# Patient Record
Sex: FEMALE | Race: WHITE | NOT HISPANIC OR LATINO | Employment: OTHER | URBAN - METROPOLITAN AREA
[De-identification: names, ages, dates, MRNs, and addresses within clinical notes are randomized per-mention and may not be internally consistent; named-entity substitution may affect disease eponyms.]

---

## 2017-09-18 ENCOUNTER — TRANSCRIBE ORDERS (OUTPATIENT)
Dept: ADMINISTRATIVE | Facility: HOSPITAL | Age: 67
End: 2017-09-18

## 2017-09-18 ENCOUNTER — ALLSCRIPTS OFFICE VISIT (OUTPATIENT)
Dept: OTHER | Facility: OTHER | Age: 67
End: 2017-09-18

## 2017-09-18 DIAGNOSIS — M21.70 UNEQUAL LEG LENGTH (ACQUIRED): Primary | ICD-10-CM

## 2017-09-19 ENCOUNTER — HOSPITAL ENCOUNTER (OUTPATIENT)
Dept: RADIOLOGY | Facility: HOSPITAL | Age: 67
Discharge: HOME | End: 2017-09-19
Payer: MEDICARE

## 2017-09-19 DIAGNOSIS — M21.70 UNEQUAL LEG LENGTH (ACQUIRED): ICD-10-CM

## 2017-09-19 PROCEDURE — 77073 BONE LENGTH STUDIES: CPT

## 2017-09-22 ENCOUNTER — GENERIC CONVERSION - ENCOUNTER (OUTPATIENT)
Dept: OTHER | Facility: OTHER | Age: 67
End: 2017-09-22

## 2017-10-03 ENCOUNTER — GENERIC CONVERSION - ENCOUNTER (OUTPATIENT)
Dept: OTHER | Facility: OTHER | Age: 67
End: 2017-10-03

## 2017-10-13 ENCOUNTER — GENERIC CONVERSION - ENCOUNTER (OUTPATIENT)
Dept: OTHER | Facility: OTHER | Age: 67
End: 2017-10-13

## 2017-10-17 ENCOUNTER — GENERIC CONVERSION - ENCOUNTER (OUTPATIENT)
Dept: OTHER | Facility: OTHER | Age: 67
End: 2017-10-17

## 2017-10-23 ENCOUNTER — ALLSCRIPTS OFFICE VISIT (OUTPATIENT)
Dept: OTHER | Facility: OTHER | Age: 67
End: 2017-10-23

## 2017-10-26 NOTE — PROGRESS NOTES
Assessment  1  Chronic right-sided low back pain with right-sided sciatica (724 2,724 3,338 29)   (M54 41,G89 29)   2  Joint disorder of pelvis or thigh (719 95) (M25 9)   3  Segmental and somatic dysfunction of sacral region (739 4) (M99 04)   4  Pubic bone pain (733 90) (M89 9)   5  Unequal leg length (736 81) (M21 70)   6  Muscle tightness (728 9) (M62 89)    Plan  Chronic right-sided low back pain with right-sided sciatica    · MethylPREDNISolone 4 MG Oral Tablet; USE AS DIRECTED  Chronic right-sided low back pain with right-sided sciatica, Joint disorder of pelvis or  thigh, Pubic bone pain, Segmental and somatic dysfunction of sacral region    · OMT 3-4 body regions - POC; Status:Complete;   Done: [id number] 01:20PM  Unequal leg length    · * CT LEG LENGTH EVALUATION (SCANOGRAM); Status:Need Information - Financial  Authorization; Requested for:[id number];    · Follow-up visit in 1 week Evaluation and Treatment  Follow-up  Status: Hold For -  Scheduling  Requested for: [id number]    Discussion/Summary  Discussion Summary:   No heat  Call 48 hours after leg length study and see if need foot lifts  energy of Pube, manipulate sacrum and pelvis  Stretched tight soft tissue  minutes spent with patient  Counseling Documentation With Imm: The patient was counseled regarding instructions for management,-- risk factor reductions  Patient Education: Educational resources provided: None  Medication SE Review and Pt Understands Tx: Possible side effects of new medications were reviewed with the patient/guardian today  The treatment plan was reviewed with the patient/guardian  The patient/guardian understands and agrees with the treatment plan   Self Referrals:   Self Referrals: No      Chief Complaint  Chief Complaint Free Text Note Form: pt here today for lower back pain stated that she had an Xray      History of Present Illness  HPI: First Visit  Back pain on and off for years, 1980 's     7 months ago, was lifting  a lot  Andrew Diop) referred  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  low back, sacral area, and goes into right buttocks  Chronic positional pain past 6 months  was exercising, this helped  at waist makes pain feel better  Goes into a hot tub a lot, heat feels good  Going to PT at [facility]  Has a Dr (Internist in [place])  noted use to crack SI joint when 15years old  Review of Systems  Complete-Female:   Constitutional: No fever, no chills, feels well, no tiredness, no recent weight gain or weight loss  Musculoskeletal: as noted in HPI  Integumentary: No complaints of skin rash or lesions, no itching, no skin wounds, no breast pain or lump  Active Problems  1  Back pain (724 5) (M54 9)   2  Depression (311) (F32 9)   3  GERD (gastroesophageal reflux disease) (530 81) (K21 9)   4  Heart disease (429 9) (I51 9)   5  Hypercholesterolemia (272 0) (E78 00)   6  Hypertension (401 9) (I10)    Past Medical History  1  History of cataract (V12 49) (Z86 69)    Surgical History  1  History of Cataract Surgery    Family History  Mother    1  Denied: Family history of Alcohol abuse   2  Family history of    3  Family history of cardiac disorder (V17 49) (Z82 49)   4  Denied: Family history of substance abuse  Father    5  Denied: Family history of Alcohol abuse   6  Family history of    7  Family history of renal failure (V18 69) (Z84 1)   8  Denied: Family history of substance abuse  Brother    5  Family history of    8  Family history of cardiac disorder (V17 49) (Z82 49)  Maternal Grandmother    11  Family history of    15  Family history of colon cancer (V16 0) (Z80 0)  Maternal Grandfather    15  Family history of     Social History   · Always uses seat belt   · Drinks coffee   · Denied: History of Drug use   · Never a smoker   · Rarely consumes alcohol (V49 89) (Z78 9)    Current Meds   1  Cozaar 50 MG Oral Tablet;  Take 1 tablet daily; Therapy: 18Sep2017 to Recorded   2  Crestor 40 MG Oral Tablet; TAKE 1 TABLET DAILY; Therapy: 18Sep2017 to Recorded   3  PriLOSEC OTC 20 MG Oral Tablet Delayed Release; take 2 tablet daily; Therapy: 18Sep2017 to Recorded   4  PROzac 20 MG Oral Capsule; TAKE 1 CAPSULE Daily; Therapy: 18Sep2017 to Recorded   5  Synthroid 150 MCG Oral Tablet; TAKE 1 TABLET DAILY; Therapy: 18Sep2017 to Recorded    Allergies  1  Trimethoprim  2  Latex    Vitals  Vital Signs    Recorded: 18Sep2017 12:17PM   Temperature 97 5 F, Oral   Heart Rate 75   Systolic 818, LUE   Diastolic 88, LUE   Height 5 ft 5 in   Weight 159 lb    BMI Calculated 26 46   BSA Calculated 1 79   O2 Saturation 98     Physical Exam    Constitutional   General appearance: No acute distress, well appearing and well nourished  Eyes   Conjunctiva and lids: No swelling, erythema or discharge  Ears, Nose, Mouth, and Throat   External inspection of ears and nose: Normal     Pulmonary   Respiratory effort: No increased work of breathing or signs of respiratory distress  Musculoskeletal   Gait and station: Abnormal  -- (Stand : Inferior right innominate)   Digits and nails: Normal without clubbing or cyanosis  Inspection/palpation of joints, bones, and muscles: Abnormal  -- (Sit: Rib #1 right posterior, inferior  Supine: Inferior right tender pube  Prone: Inferior right PSIS, Sacrum deep on right  Increased soft tissue tension and tenderness right LSI area)   Skin   Skin and subcutaneous tissue: Normal without rashes or lesions  Neurologic   Sensation: No sensory loss  Psychiatric   Orientation to person, place, and time: Normal     Mood and affect: Normal          Results/Data  PHQ-2 Adult Depression Screening 18Sep2017 12:25PM UserTrino     Test Name Result Flag Reference   PHQ-2 Adult Depression Score 6     Over the last two weeks, how often have you been bothered by any of the following problems?   Little interest or pleasure in doing things: Nearly every day - 3  Feeling down, depressed, or hopeless: Nearly every day - 3   PHQ-2 Adult Depression Screening Positive         Signatures   Electronically signed by : Merly Courtney DO; Sep 18 2017  1:25PM EST                       (Author)

## 2017-10-27 ENCOUNTER — ALLSCRIPTS OFFICE VISIT (OUTPATIENT)
Dept: OTHER | Facility: OTHER | Age: 67
End: 2017-10-27

## 2017-10-31 ENCOUNTER — GENERIC CONVERSION - ENCOUNTER (OUTPATIENT)
Dept: OTHER | Facility: OTHER | Age: 67
End: 2017-10-31

## 2017-11-03 ENCOUNTER — ALLSCRIPTS OFFICE VISIT (OUTPATIENT)
Dept: OTHER | Facility: OTHER | Age: 67
End: 2017-11-03

## 2017-11-13 ENCOUNTER — GENERIC CONVERSION - ENCOUNTER (OUTPATIENT)
Dept: OTHER | Facility: OTHER | Age: 67
End: 2017-11-13

## 2017-11-17 ENCOUNTER — GENERIC CONVERSION - ENCOUNTER (OUTPATIENT)
Dept: OTHER | Facility: OTHER | Age: 67
End: 2017-11-17

## 2017-11-24 ENCOUNTER — GENERIC CONVERSION - ENCOUNTER (OUTPATIENT)
Dept: OTHER | Facility: OTHER | Age: 67
End: 2017-11-24

## 2017-11-28 ENCOUNTER — GENERIC CONVERSION - ENCOUNTER (OUTPATIENT)
Dept: OTHER | Facility: OTHER | Age: 67
End: 2017-11-28

## 2017-12-08 ENCOUNTER — GENERIC CONVERSION - ENCOUNTER (OUTPATIENT)
Dept: OTHER | Facility: OTHER | Age: 67
End: 2017-12-08

## 2017-12-12 ENCOUNTER — GENERIC CONVERSION - ENCOUNTER (OUTPATIENT)
Dept: OTHER | Facility: OTHER | Age: 67
End: 2017-12-12

## 2017-12-15 ENCOUNTER — GENERIC CONVERSION - ENCOUNTER (OUTPATIENT)
Dept: OTHER | Facility: OTHER | Age: 67
End: 2017-12-15

## 2017-12-20 ENCOUNTER — GENERIC CONVERSION - ENCOUNTER (OUTPATIENT)
Dept: OTHER | Facility: OTHER | Age: 67
End: 2017-12-20

## 2017-12-22 ENCOUNTER — GENERIC CONVERSION - ENCOUNTER (OUTPATIENT)
Dept: OTHER | Facility: OTHER | Age: 67
End: 2017-12-22

## 2017-12-26 ENCOUNTER — GENERIC CONVERSION - ENCOUNTER (OUTPATIENT)
Dept: OTHER | Facility: OTHER | Age: 67
End: 2017-12-26

## 2018-01-02 ENCOUNTER — GENERIC CONVERSION - ENCOUNTER (OUTPATIENT)
Dept: OTHER | Facility: OTHER | Age: 68
End: 2018-01-02

## 2018-01-05 ENCOUNTER — GENERIC CONVERSION - ENCOUNTER (OUTPATIENT)
Dept: OTHER | Facility: OTHER | Age: 68
End: 2018-01-05

## 2018-01-09 ENCOUNTER — GENERIC CONVERSION - ENCOUNTER (OUTPATIENT)
Dept: OTHER | Facility: OTHER | Age: 68
End: 2018-01-09

## 2018-01-10 ENCOUNTER — GENERIC CONVERSION - ENCOUNTER (OUTPATIENT)
Dept: OTHER | Facility: OTHER | Age: 68
End: 2018-01-10

## 2018-01-12 NOTE — MISCELLANEOUS
Message    30 Oct 2017 2:20 PM   Pt c/o back pain again  Pain didn't let her sleep last night, tried to go out today but can't handle it  Needs to drive to [facility] tomorrow  Adilson Miranda - 30 Oct 2017 4:18 PM   Medrol dose pack  Pt called back before we could let her know about medrol dose rc  She stated she will just keep her appt for Wednesday and rest to see if she gets relief  Pt left a msg on 10/31/2017 and said she was unable to keep her appt for 11/1/17 because she has to be back at [place] for an appt with her skin cancer doctor  She asked if she could be seen today but schedule was really full, pt refused to have medrol dose rc rx so her appt was rescheduled to 11/3/17  BF/VFP                Active Problems   1  Acute right-sided low back pain with right-sided sciatica (724 2,724 3) (M54 41)  2  Back pain (724 5) (M54 9)  3  Chronic right-sided low back pain with right-sided sciatica (724 2,724 3,338 29)   (M54 41,G89 29)  4  Depression (311) (F32 9)  5  Encounter for special screening examination for genitourinary disorder (V81 6) (Z13 89)  6  GERD (gastroesophageal reflux disease) (530 81) (K21 9)  7  Heart disease (429 9) (I51 9)  8  Hypercholesterolemia (272 0) (E78 00)  9  Hypertension (401 9) (I10)  10  Joint disorder of pelvis or thigh (719 95) (M25 9)  11  Lumbar radiculopathy (724 4) (M54 16)  12  Muscle strain (848 9) (T14 8XXA)  13  Muscle tightness (728 9) (M62 89)  14  Pubic bone pain (733 90) (M89 9)  15  Rib pain on left side (786 50) (R07 81)  16  Sacrum sprain, subsequent encounter (V58 89,847 3) (S33 8XXD)  17  Segmental and somatic dysfunction of lumbar region (739 3) (M99 03)  18  Segmental and somatic dysfunction of sacral region (739 4) (M99 04)  19  Segmental dysfunction of pelvic region (739 5) (M99 05)  20  Somatic dysfunction of lumbar region (739 3) (M99 03)  21  Strain of back, subsequent encounter (V58 89,847 9) (S39 012D)  22   Strain of lumbar region, initial encounter (847 2) (S39 012A)  23  Strain of pelvis, initial encounter (848 5) (S39 013A)  24  Strain of pelvis, subsequent encounter (V58 89,848 5) (S39 013D)  25  Thoracic back pain (724 1) (M54 6)  26  Unequal leg length (736 81) (M21 70)    Current Meds  1  Cozaar 50 MG Oral Tablet; Take 1 tablet daily; Therapy: 43Caj7064 to Recorded  2  Crestor 40 MG Oral Tablet; TAKE 1 TABLET DAILY; Therapy: 35Sho0017 to Recorded  3  PriLOSEC OTC 20 MG Oral Tablet Delayed Release; take 2 tablet daily; Therapy: 02Eih9570 to Recorded  4  PROzac 20 MG Oral Capsule; TAKE 1 CAPSULE Daily; Therapy: 68Bif2319 to Recorded  5  Synthroid 150 MCG Oral Tablet; TAKE 1 TABLET DAILY; Therapy: 09Oea5770 to Recorded    Allergies   1  Trimethoprim   2   Latex    Signatures   Electronically signed by : Neeru Quintanilla DO; Nov 1 2017  9:27AM EST                       (Author)

## 2018-01-13 VITALS
TEMPERATURE: 97.9 F | SYSTOLIC BLOOD PRESSURE: 132 MMHG | HEIGHT: 65 IN | DIASTOLIC BLOOD PRESSURE: 70 MMHG | BODY MASS INDEX: 32.49 KG/M2 | WEIGHT: 195 LBS | HEART RATE: 79 BPM | RESPIRATION RATE: 16 BRPM | OXYGEN SATURATION: 98 %

## 2018-01-14 VITALS
DIASTOLIC BLOOD PRESSURE: 88 MMHG | OXYGEN SATURATION: 98 % | SYSTOLIC BLOOD PRESSURE: 146 MMHG | TEMPERATURE: 97.5 F | BODY MASS INDEX: 26.49 KG/M2 | HEIGHT: 65 IN | HEART RATE: 75 BPM | WEIGHT: 159 LBS

## 2018-01-14 VITALS
OXYGEN SATURATION: 98 % | DIASTOLIC BLOOD PRESSURE: 76 MMHG | SYSTOLIC BLOOD PRESSURE: 142 MMHG | HEIGHT: 65 IN | TEMPERATURE: 98.4 F | HEART RATE: 83 BPM

## 2018-01-14 NOTE — MISCELLANEOUS
Message  Return to work or school:   Ceci Mccarty is under my professional care  She was seen in my office on 10/23/2017  Patient is unable to do physical therapy until cleared by       She is not able to participate in sports or gym class           Signatures   Electronically signed by : Lisa Cherry DO; Oct 23 2017  2:12PM EST                       (Author)

## 2018-01-19 ENCOUNTER — GENERIC CONVERSION - ENCOUNTER (OUTPATIENT)
Dept: OTHER | Facility: OTHER | Age: 68
End: 2018-01-19

## 2018-01-19 DIAGNOSIS — M99.05 SEGMENTAL AND SOMATIC DYSFUNCTION OF PELVIC REGION: ICD-10-CM

## 2018-01-19 DIAGNOSIS — M54.2 CERVICALGIA: ICD-10-CM

## 2018-01-19 DIAGNOSIS — M99.04 SEGMENTAL AND SOMATIC DYSFUNCTION OF SACRAL REGION: ICD-10-CM

## 2018-01-22 ENCOUNTER — APPOINTMENT (OUTPATIENT)
Dept: PHYSICAL THERAPY | Facility: REHABILITATION | Age: 68
End: 2018-01-22
Payer: MEDICARE

## 2018-01-22 VITALS
TEMPERATURE: 97.8 F | DIASTOLIC BLOOD PRESSURE: 88 MMHG | SYSTOLIC BLOOD PRESSURE: 148 MMHG | OXYGEN SATURATION: 98 % | HEIGHT: 65 IN | WEIGHT: 186 LBS | HEART RATE: 80 BPM | HEART RATE: 85 BPM | SYSTOLIC BLOOD PRESSURE: 144 MMHG | BODY MASS INDEX: 30.99 KG/M2 | HEIGHT: 65 IN | OXYGEN SATURATION: 97 % | DIASTOLIC BLOOD PRESSURE: 86 MMHG | TEMPERATURE: 97.7 F

## 2018-01-22 VITALS
HEIGHT: 65 IN | HEART RATE: 83 BPM | TEMPERATURE: 98.2 F | OXYGEN SATURATION: 98 % | DIASTOLIC BLOOD PRESSURE: 84 MMHG | SYSTOLIC BLOOD PRESSURE: 142 MMHG

## 2018-01-22 VITALS
SYSTOLIC BLOOD PRESSURE: 146 MMHG | HEART RATE: 80 BPM | DIASTOLIC BLOOD PRESSURE: 88 MMHG | BODY MASS INDEX: 30.82 KG/M2 | HEIGHT: 65 IN | TEMPERATURE: 97.6 F | WEIGHT: 185 LBS | OXYGEN SATURATION: 98 %

## 2018-01-22 VITALS
HEIGHT: 65 IN | SYSTOLIC BLOOD PRESSURE: 142 MMHG | OXYGEN SATURATION: 98 % | DIASTOLIC BLOOD PRESSURE: 84 MMHG | TEMPERATURE: 97.3 F | HEART RATE: 84 BPM

## 2018-01-22 VITALS
HEART RATE: 78 BPM | HEIGHT: 65 IN | SYSTOLIC BLOOD PRESSURE: 118 MMHG | WEIGHT: 192 LBS | DIASTOLIC BLOOD PRESSURE: 62 MMHG | OXYGEN SATURATION: 98 % | BODY MASS INDEX: 31.99 KG/M2 | TEMPERATURE: 97.9 F | RESPIRATION RATE: 16 BRPM

## 2018-01-22 VITALS
DIASTOLIC BLOOD PRESSURE: 76 MMHG | OXYGEN SATURATION: 97 % | TEMPERATURE: 98.1 F | SYSTOLIC BLOOD PRESSURE: 142 MMHG | HEART RATE: 85 BPM | HEIGHT: 65 IN

## 2018-01-22 VITALS
TEMPERATURE: 97.7 F | HEART RATE: 78 BPM | DIASTOLIC BLOOD PRESSURE: 88 MMHG | SYSTOLIC BLOOD PRESSURE: 146 MMHG | BODY MASS INDEX: 31.49 KG/M2 | WEIGHT: 189 LBS | OXYGEN SATURATION: 98 % | HEIGHT: 65 IN

## 2018-01-22 VITALS
SYSTOLIC BLOOD PRESSURE: 144 MMHG | OXYGEN SATURATION: 97 % | WEIGHT: 188 LBS | DIASTOLIC BLOOD PRESSURE: 86 MMHG | HEIGHT: 65 IN | TEMPERATURE: 97.6 F | BODY MASS INDEX: 31.32 KG/M2 | HEART RATE: 90 BPM

## 2018-01-22 DIAGNOSIS — M54.2 CERVICALGIA: ICD-10-CM

## 2018-01-22 DIAGNOSIS — M99.04 SEGMENTAL AND SOMATIC DYSFUNCTION OF SACRAL REGION: ICD-10-CM

## 2018-01-22 DIAGNOSIS — M99.05 SEGMENTAL AND SOMATIC DYSFUNCTION OF PELVIC REGION: ICD-10-CM

## 2018-01-22 PROCEDURE — 97110 THERAPEUTIC EXERCISES: CPT

## 2018-01-22 PROCEDURE — 97140 MANUAL THERAPY 1/> REGIONS: CPT

## 2018-01-22 PROCEDURE — G8990 OTHER PT/OT CURRENT STATUS: HCPCS

## 2018-01-22 PROCEDURE — 97161 PT EVAL LOW COMPLEX 20 MIN: CPT

## 2018-01-22 PROCEDURE — G8991 OTHER PT/OT GOAL STATUS: HCPCS

## 2018-01-24 VITALS
HEIGHT: 65 IN | TEMPERATURE: 98.1 F | OXYGEN SATURATION: 98 % | HEART RATE: 77 BPM | DIASTOLIC BLOOD PRESSURE: 72 MMHG | BODY MASS INDEX: 30.32 KG/M2 | SYSTOLIC BLOOD PRESSURE: 134 MMHG | WEIGHT: 182 LBS

## 2018-01-24 VITALS
BODY MASS INDEX: 29.95 KG/M2 | SYSTOLIC BLOOD PRESSURE: 140 MMHG | DIASTOLIC BLOOD PRESSURE: 80 MMHG | WEIGHT: 180 LBS | HEART RATE: 74 BPM | RESPIRATION RATE: 14 BRPM | OXYGEN SATURATION: 98 %

## 2018-01-24 VITALS
DIASTOLIC BLOOD PRESSURE: 84 MMHG | BODY MASS INDEX: 30.32 KG/M2 | SYSTOLIC BLOOD PRESSURE: 144 MMHG | WEIGHT: 182 LBS | HEIGHT: 65 IN | TEMPERATURE: 97.9 F | OXYGEN SATURATION: 98 % | HEART RATE: 72 BPM

## 2018-01-24 VITALS
HEART RATE: 83 BPM | OXYGEN SATURATION: 97 % | BODY MASS INDEX: 30.49 KG/M2 | SYSTOLIC BLOOD PRESSURE: 146 MMHG | HEIGHT: 65 IN | TEMPERATURE: 97.9 F | DIASTOLIC BLOOD PRESSURE: 84 MMHG | WEIGHT: 183 LBS

## 2018-01-24 VITALS
OXYGEN SATURATION: 98 % | HEART RATE: 82 BPM | TEMPERATURE: 98.1 F | SYSTOLIC BLOOD PRESSURE: 152 MMHG | HEIGHT: 65 IN | DIASTOLIC BLOOD PRESSURE: 88 MMHG

## 2018-01-24 VITALS
BODY MASS INDEX: 29.45 KG/M2 | HEIGHT: 65 IN | TEMPERATURE: 97.3 F | SYSTOLIC BLOOD PRESSURE: 146 MMHG | DIASTOLIC BLOOD PRESSURE: 88 MMHG | HEART RATE: 89 BPM | OXYGEN SATURATION: 98 %

## 2018-01-24 VITALS
HEIGHT: 65 IN | SYSTOLIC BLOOD PRESSURE: 148 MMHG | TEMPERATURE: 97.6 F | HEART RATE: 66 BPM | DIASTOLIC BLOOD PRESSURE: 72 MMHG | OXYGEN SATURATION: 98 % | BODY MASS INDEX: 30.32 KG/M2 | WEIGHT: 182 LBS

## 2018-01-24 VITALS
DIASTOLIC BLOOD PRESSURE: 72 MMHG | WEIGHT: 181 LBS | OXYGEN SATURATION: 98 % | SYSTOLIC BLOOD PRESSURE: 144 MMHG | HEART RATE: 78 BPM | HEIGHT: 65 IN | TEMPERATURE: 97.6 F | BODY MASS INDEX: 30.16 KG/M2

## 2018-01-24 VITALS
OXYGEN SATURATION: 96 % | HEIGHT: 65 IN | TEMPERATURE: 98.5 F | SYSTOLIC BLOOD PRESSURE: 142 MMHG | DIASTOLIC BLOOD PRESSURE: 88 MMHG | HEART RATE: 76 BPM

## 2018-01-24 VITALS
OXYGEN SATURATION: 98 % | SYSTOLIC BLOOD PRESSURE: 126 MMHG | HEIGHT: 65 IN | TEMPERATURE: 97.8 F | HEART RATE: 77 BPM | DIASTOLIC BLOOD PRESSURE: 72 MMHG | BODY MASS INDEX: 29.99 KG/M2 | WEIGHT: 180 LBS

## 2018-01-24 VITALS
DIASTOLIC BLOOD PRESSURE: 98 MMHG | OXYGEN SATURATION: 98 % | BODY MASS INDEX: 29.49 KG/M2 | RESPIRATION RATE: 15 BRPM | SYSTOLIC BLOOD PRESSURE: 148 MMHG | HEIGHT: 65 IN | HEART RATE: 107 BPM | WEIGHT: 177 LBS

## 2018-01-26 ENCOUNTER — OFFICE VISIT (OUTPATIENT)
Dept: PHYSICAL THERAPY | Facility: REHABILITATION | Age: 68
End: 2018-01-26
Payer: MEDICARE

## 2018-01-26 DIAGNOSIS — M54.2 CERVICALGIA: ICD-10-CM

## 2018-01-26 DIAGNOSIS — M99.04 SOMATIC DYSFUNCTION OF SACRAL REGION: Primary | ICD-10-CM

## 2018-01-26 DIAGNOSIS — M99.05 SOMATIC DYSFUNCTION OF PELVIS REGION: ICD-10-CM

## 2018-01-26 PROCEDURE — 97110 THERAPEUTIC EXERCISES: CPT | Performed by: PHYSICAL THERAPIST

## 2018-01-26 PROCEDURE — 97112 NEUROMUSCULAR REEDUCATION: CPT | Performed by: PHYSICAL THERAPIST

## 2018-01-26 PROCEDURE — 97140 MANUAL THERAPY 1/> REGIONS: CPT | Performed by: PHYSICAL THERAPIST

## 2018-01-26 PROCEDURE — G8990 OTHER PT/OT CURRENT STATUS: HCPCS | Performed by: PHYSICAL THERAPIST

## 2018-01-26 PROCEDURE — G8991 OTHER PT/OT GOAL STATUS: HCPCS | Performed by: PHYSICAL THERAPIST

## 2018-01-26 NOTE — PROGRESS NOTES
Daily Note     Today's date: 2018  Patient name: Marivel Smith  : 1950  MRN: [id number]  Referring provider: Edwardo Avalos DO  Dx:   Encounter Diagnoses   Name Primary?  Somatic dysfunction of sacral region Yes    Somatic dysfunction of pelvis region     Cervicalgia                   Subjective: Patient reports that her neck has been doing a little better and that her back was doing better but she twisted a certain way moving in bed this morning and really irritated it along right PSIS region  Objective: See treatment diary below  Precautions: HTN  Daily Treatment Diary     Manual  18        L3 gr 5  4 min  Sacral pressure 3 min  Exercise Diary  18        Seated c-spine rotation right arm elevated 4 min  Supine chin nod 2 x 10, 5sec        Seated chin nod  2x10, 5sec        TA BP cuff iso 1 x 10, 10 sec  TA BKFO cuff 2 x 10        Patient education SI belt        Supine hip add  2x10, 5 sec  Modalities                                                           Assessment: Tolerated treatment fair  Patient demonstrated fair core activation with exercises today  Improved symptom reporting with manual compression to bilateral ASIS  Educate patient on use of SI belt  Also cervical symptoms improve with support of right shoulder girdle  Plan: Continue per plan of care

## 2018-01-26 NOTE — PROGRESS NOTES
{SL AMB PT/OT NOTE [id number]}    Today's date: 2018  Patient name: Radha Kimball  : 1950  MRN: [id number]  Referring provider: Alyssa Farooq DO  Dx:   Encounter Diagnoses   Name Primary?     Somatic dysfunction of sacral region Yes    Somatic dysfunction of pelvis region                   Assessment/Plan    Subjective    Objective    Precautions: ***    Daily Treatment Diary     Manual                                                                                   Exercise Diary                                                                                                                                                                                                                                                                                      Modalities

## 2018-01-29 ENCOUNTER — OFFICE VISIT (OUTPATIENT)
Dept: PHYSICAL THERAPY | Facility: REHABILITATION | Age: 68
End: 2018-01-29
Payer: MEDICARE

## 2018-01-29 DIAGNOSIS — M99.05 SOMATIC DYSFUNCTION OF PELVIS REGION: ICD-10-CM

## 2018-01-29 DIAGNOSIS — M99.04 SOMATIC DYSFUNCTION OF SACRAL REGION: Primary | ICD-10-CM

## 2018-01-29 DIAGNOSIS — M54.2 CERVICALGIA: ICD-10-CM

## 2018-01-29 PROCEDURE — 97110 THERAPEUTIC EXERCISES: CPT | Performed by: PHYSICAL THERAPIST

## 2018-01-29 PROCEDURE — 97112 NEUROMUSCULAR REEDUCATION: CPT | Performed by: PHYSICAL THERAPIST

## 2018-01-29 PROCEDURE — 97140 MANUAL THERAPY 1/> REGIONS: CPT | Performed by: PHYSICAL THERAPIST

## 2018-01-29 NOTE — PROGRESS NOTES
Daily Note     Today's date: 2018  Patient name: Neysa Najjar  : 1950  MRN: [id number]  Referring provider: Radha Moss DO  Dx:   Encounter Diagnoses   Name Primary?  Somatic dysfunction of sacral region Yes    Somatic dysfunction of pelvis region     Cervicalgia                   Subjective: Patient reports that she had trouble sleeping on her side Friday night/Saturday morning and was lying on her left side with right side over it and has been having discomfort standing up straight  Pain described as waxing and waning  Reports discomfort when tilting head to the left  Objective: See treatment diary below  Precautions: HTN  Daily Treatment Diary     Manual  18       L3 gr 5  4 min  Sacral pressure 3 min  C7 opening  Gr  5        R  PI   Gr  5                    Exercise Diary  18       Seated c-spine rotation right arm elevated 4 min  NP       Supine chin nod 2 x 10, 5sec 2 x 10, 5 sec  Seated chin nod  2x10, 5sec HEP       TA BP cuff iso 1 x 10, 10 sec  1 x 10, 10 sec  TA BKFO cuff 2 x 10 Held       Patient education SI belt NP       Supine hip add  2x10, 5 sec  NP       S/L hip hiker- B/L  2 x 10, 5 sec  Supine chin nod with horz  Abd  YTB  2 x 10, 5 sec  Modalities                                                         Assessment: Tolerated treatment well  Patient reported less lumbar irritation following treatment today  Cueing and reassurance needed for correct exercise performance and to avoid side lying with hip adducted and body twisted in opposite direction  Plan: Continue per plan of care  with multifidus strengthening

## 2018-01-31 ENCOUNTER — OFFICE VISIT (OUTPATIENT)
Dept: FAMILY MEDICINE CLINIC | Facility: CLINIC | Age: 68
End: 2018-01-31
Payer: MEDICARE

## 2018-01-31 ENCOUNTER — APPOINTMENT (OUTPATIENT)
Dept: PHYSICAL THERAPY | Facility: REHABILITATION | Age: 68
End: 2018-01-31
Payer: MEDICARE

## 2018-01-31 VITALS
BODY MASS INDEX: 29.16 KG/M2 | SYSTOLIC BLOOD PRESSURE: 142 MMHG | WEIGHT: 175 LBS | OXYGEN SATURATION: 98 % | HEART RATE: 82 BPM | DIASTOLIC BLOOD PRESSURE: 86 MMHG | HEIGHT: 65 IN | TEMPERATURE: 98.4 F

## 2018-01-31 DIAGNOSIS — M99.03 SEGMENTAL AND SOMATIC DYSFUNCTION OF LUMBAR REGION: ICD-10-CM

## 2018-01-31 DIAGNOSIS — M99.02 SOMATIC DYSFUNCTION OF THORACIC REGION: ICD-10-CM

## 2018-01-31 DIAGNOSIS — M99.08 SEGMENTAL AND SOMATIC DYSFUNCTION OF RIB CAGE: ICD-10-CM

## 2018-01-31 DIAGNOSIS — S29.012D STRAIN OF MUSCLE AND TENDON OF BACK WALL OF THORAX, SUBSEQUENT ENCOUNTER: ICD-10-CM

## 2018-01-31 DIAGNOSIS — M62.830 MUSCLE SPASM OF BACK: ICD-10-CM

## 2018-01-31 DIAGNOSIS — S39.012A LUMBAR STRAIN, INITIAL ENCOUNTER: Primary | ICD-10-CM

## 2018-01-31 DIAGNOSIS — S23.41XD RIB SPRAIN, SUBSEQUENT ENCOUNTER: ICD-10-CM

## 2018-01-31 PROCEDURE — 98926 OSTEOPATH MANJ 3-4 REGIONS: CPT | Performed by: FAMILY MEDICINE

## 2018-01-31 PROCEDURE — 99214 OFFICE O/P EST MOD 30 MIN: CPT | Performed by: FAMILY MEDICINE

## 2018-01-31 RX ORDER — OMEPRAZOLE 40 MG/1
40 CAPSULE, DELAYED RELEASE ORAL
COMMUNITY

## 2018-01-31 RX ORDER — FLUOXETINE HYDROCHLORIDE 20 MG/1
1 CAPSULE ORAL DAILY
COMMUNITY
Start: 2017-09-18 | End: 2018-04-16

## 2018-01-31 RX ORDER — ASPIRIN 81 MG/1
81 TABLET, CHEWABLE ORAL
COMMUNITY

## 2018-01-31 RX ORDER — LEVOTHYROXINE SODIUM 0.15 MG/1
125 TABLET ORAL
COMMUNITY

## 2018-01-31 RX ORDER — EZETIMIBE 10 MG/1
10 TABLET ORAL
COMMUNITY
Start: 2017-10-31

## 2018-01-31 RX ORDER — LOSARTAN POTASSIUM 50 MG
100 TABLET ORAL DAILY
COMMUNITY
Start: 2017-09-18

## 2018-01-31 RX ORDER — ROSUVASTATIN CALCIUM 40 MG/1
1 TABLET, COATED ORAL DAILY
COMMUNITY
Start: 2017-09-18

## 2018-01-31 NOTE — PROGRESS NOTES
Assessment/Plan:pt here today for back pain     No problem-specific Assessment & Plan notes found for this encounter  1  Lumbar strain, initial encounter     2  Segmental and somatic dysfunction of lumbar region     3  Strain of muscle and tendon of back wall of thorax, subsequent encounter     4  Somatic dysfunction of thoracic region     5  Segmental and somatic dysfunction of rib cage     6  Rib sprain, subsequent encounter     7  Muscle spasm of back            There are no diagnoses linked to this encounter  Subjective:      Patient ID: Cole Curry is a 75 y o  female  Twisted wrongly past weekend, RLB pain localized, left CT junction discomfort when back went out  Attending PT and enjoying the sessions and also continuing exercises at home  The following portions of the patient's history were reviewed and updated as appropriate: allergies, current medications, past family history, past medical history, past social history, past surgical history and problem list     Review of Systems   Constitutional: Negative  HENT: Negative  Respiratory: Negative  Cardiovascular: Negative  Musculoskeletal: Positive for back pain  Skin: Negative  Neurological: Negative  Objective:     Physical Exam   Constitutional: She appears well-developed and well-nourished  HENT:   Head: Normocephalic and atraumatic  Cardiovascular: Normal rate  Pulmonary/Chest: Effort normal    Abdominal: Soft     Musculoskeletal:        Back:

## 2018-02-02 ENCOUNTER — OFFICE VISIT (OUTPATIENT)
Dept: PHYSICAL THERAPY | Facility: REHABILITATION | Age: 68
End: 2018-02-02
Payer: MEDICARE

## 2018-02-02 ENCOUNTER — OFFICE VISIT (OUTPATIENT)
Dept: FAMILY MEDICINE CLINIC | Facility: CLINIC | Age: 68
End: 2018-02-02
Payer: MEDICARE

## 2018-02-02 VITALS
OXYGEN SATURATION: 98 % | HEART RATE: 82 BPM | TEMPERATURE: 97.4 F | HEIGHT: 66 IN | SYSTOLIC BLOOD PRESSURE: 126 MMHG | DIASTOLIC BLOOD PRESSURE: 84 MMHG

## 2018-02-02 DIAGNOSIS — M21.70 UNEQUAL LEG LENGTH: ICD-10-CM

## 2018-02-02 DIAGNOSIS — M99.04 SOMATIC DYSFUNCTION OF SACRAL REGION: ICD-10-CM

## 2018-02-02 DIAGNOSIS — M54.2 CERVICALGIA: Primary | ICD-10-CM

## 2018-02-02 DIAGNOSIS — S39.012D STRAIN OF LUMBAR REGION, SUBSEQUENT ENCOUNTER: ICD-10-CM

## 2018-02-02 DIAGNOSIS — M99.03 SOMATIC DYSFUNCTION OF LUMBAR REGION: Primary | ICD-10-CM

## 2018-02-02 DIAGNOSIS — M99.05 SOMATIC DYSFUNCTION OF PELVIS REGION: ICD-10-CM

## 2018-02-02 PROCEDURE — 98925 OSTEOPATH MANJ 1-2 REGIONS: CPT | Performed by: FAMILY MEDICINE

## 2018-02-02 PROCEDURE — 97140 MANUAL THERAPY 1/> REGIONS: CPT | Performed by: PHYSICAL THERAPIST

## 2018-02-02 PROCEDURE — 99213 OFFICE O/P EST LOW 20 MIN: CPT | Performed by: FAMILY MEDICINE

## 2018-02-02 PROCEDURE — 97110 THERAPEUTIC EXERCISES: CPT | Performed by: PHYSICAL THERAPIST

## 2018-02-02 PROCEDURE — 97112 NEUROMUSCULAR REEDUCATION: CPT | Performed by: PHYSICAL THERAPIST

## 2018-02-02 NOTE — PROGRESS NOTES
Assessment/Plan: patient here today for pelvis pain     No problem-specific Assessment & Plan notes found for this encounter  1  Somatic dysfunction of lumbar region     2  Strain of lumbar region, subsequent encounter     3  Unequal leg length            There are no diagnoses linked to this encounter  Subjective:      Patient ID: Lucy Perez is a 75 y o  female  Hurt right low back, does it a night rolling and twisting while sleeping  Wakes with pain in am   Has 4 mm of lifts in right shoe  Using a sacral iliac belt  The following portions of the patient's history were reviewed and updated as appropriate: allergies, current medications, past family history, past medical history, past social history, past surgical history and problem list     Review of Systems   HENT: Negative  Gastrointestinal: Negative  Genitourinary: Negative  Musculoskeletal: Positive for back pain  Neurological: Negative  Objective:     Physical Exam   Constitutional: She appears well-developed and well-nourished  Musculoskeletal: She exhibits tenderness  Back:    Inferior right innominate   Neurological: She is alert

## 2018-02-02 NOTE — PROGRESS NOTES
Daily Note     Today's date: 2018  Patient name: Sheldon Ruiz  : 1950  MRN: [id number]  Referring provider: Bonnie Martin DO  Dx:   Encounter Diagnoses   Name Primary?  Cervicalgia Yes    Somatic dysfunction of pelvis region     Somatic dysfunction of sacral region      Start Time: 1045  Stop Time: 1145  Total time in clinic (min): 60 minutes    Subjective: Wednesday morning patient reported sharp pain in right gluteal region that extended into lower back  She went to doctor Wednesday and manipulated lower back and neck which has helped  She reports that she has received SI belt which she has not tried wearing yet  She reports she thinks it is how she is sleeping that will irritate her  Objective: See treatment diary below  Precautions: HTN  Daily Treatment Diary     Manual  18      L3 gr 5  4 min  NP      Sacral pressure 3 min  NP      C7 opening  Gr  5  NP      R  PI   Gr  5 NP      Graston Right erector spinae   5 min  T12-L1 Gr  5   5 min  Exercise Diary  18      Seated c-spine rotation right arm elevated 4 min  NP NP      Supine chin nod iso  2 x 10, 5sec 2 x 10, 5 sec  2 x 10, 5 sec  Seated chin nod  2x10, 5sec HEP       TA iso 1 x 10, 10 sec  1 x 10, 10 sec  1 x 10, 10 sec  TA BKFO cuff 2 x 10 Held HELD      Patient education SI belt NP       Supine hip add  2x10, 5 sec  NP NP      S/L hip hiker- B/L  2 x 10, 5 sec  NP      Prone multifidus iso    3 min  , 5 sec  Prone TA with knee flexion   1 x 20 per side      Supine chin nod with horz  Abd  YTB  2 x 10, 5 sec  HELD      Prone glute set with feet elevated   1 x 20, 5 sec  Assessment: Patient reported that her back and neck felt better with treatment today  Continuous cueing needed for TA and gluteal activation to avoid erector spinae and hamstring compensation  Plan: Continue core and cervical stabilization

## 2018-02-02 NOTE — PATIENT INSTRUCTIONS
Added 2 mm for total of 6 mm right shoe  Unfold lumbar, stretched right para lumbar tissue superior to inferior to S2 level  No heat

## 2018-02-05 ENCOUNTER — APPOINTMENT (OUTPATIENT)
Dept: PHYSICAL THERAPY | Facility: REHABILITATION | Age: 68
End: 2018-02-05
Payer: MEDICARE

## 2018-02-06 ENCOUNTER — OFFICE VISIT (OUTPATIENT)
Dept: FAMILY MEDICINE CLINIC | Facility: CLINIC | Age: 68
End: 2018-02-06
Payer: MEDICARE

## 2018-02-06 ENCOUNTER — OFFICE VISIT (OUTPATIENT)
Dept: PHYSICAL THERAPY | Facility: REHABILITATION | Age: 68
End: 2018-02-06
Payer: MEDICARE

## 2018-02-06 VITALS
HEART RATE: 82 BPM | TEMPERATURE: 97.6 F | WEIGHT: 178 LBS | BODY MASS INDEX: 28.61 KG/M2 | OXYGEN SATURATION: 98 % | DIASTOLIC BLOOD PRESSURE: 84 MMHG | HEIGHT: 66 IN | SYSTOLIC BLOOD PRESSURE: 152 MMHG

## 2018-02-06 DIAGNOSIS — M99.05 PELVIC SOMATIC DYSFUNCTION: ICD-10-CM

## 2018-02-06 DIAGNOSIS — G89.29 HIP PAIN, CHRONIC, UNSPECIFIED LATERALITY: Primary | ICD-10-CM

## 2018-02-06 DIAGNOSIS — S39.013D STRAIN OF MUSCLE, FASCIA AND TENDON OF PELVIS, SUBSEQUENT ENCOUNTER: ICD-10-CM

## 2018-02-06 DIAGNOSIS — M25.559 HIP PAIN, CHRONIC, UNSPECIFIED LATERALITY: Primary | ICD-10-CM

## 2018-02-06 DIAGNOSIS — M54.2 CERVICALGIA: ICD-10-CM

## 2018-02-06 DIAGNOSIS — M99.04 SOMATIC DYSFUNCTION OF SACRAL REGION: ICD-10-CM

## 2018-02-06 DIAGNOSIS — M99.05 SOMATIC DYSFUNCTION OF PELVIS REGION: Primary | ICD-10-CM

## 2018-02-06 PROCEDURE — G8991 OTHER PT/OT GOAL STATUS: HCPCS | Performed by: PHYSICAL THERAPIST

## 2018-02-06 PROCEDURE — G8990 OTHER PT/OT CURRENT STATUS: HCPCS | Performed by: PHYSICAL THERAPIST

## 2018-02-06 PROCEDURE — 97112 NEUROMUSCULAR REEDUCATION: CPT | Performed by: PHYSICAL THERAPIST

## 2018-02-06 PROCEDURE — 98925 OSTEOPATH MANJ 1-2 REGIONS: CPT | Performed by: FAMILY MEDICINE

## 2018-02-06 PROCEDURE — 97140 MANUAL THERAPY 1/> REGIONS: CPT | Performed by: PHYSICAL THERAPIST

## 2018-02-06 PROCEDURE — 97110 THERAPEUTIC EXERCISES: CPT | Performed by: PHYSICAL THERAPIST

## 2018-02-06 NOTE — PROGRESS NOTES
Daily Note     Today's date: 2018  Patient name: Jess Lacey  : 1950  MRN: [id number]  Referring provider: Kathy Cruz DO  Dx:   Encounter Diagnoses   Name Primary?  Somatic dysfunction of pelvis region Yes    Somatic dysfunction of sacral region     Cervicalgia                 Subjective: Patient reports that     Objective: See treatment diary below  Precautions: HTN  Daily Treatment Diary     Manual  18 2     L3 gr 5  4 min  NP Gr 3 gapping     Sacral pressure 3 min  NP NP     C7 opening  Gr  5  NP NP     R  PI   Gr  5 NP NP     Graston Right erector spinae   5 min  NP     T12-L1 Gr  5   5 min  NP         Exercise Diary  18     Seated c-spine rotation right arm elevated 4 min  NP NP NP     Supine chin nod iso  2 x 10, 5sec 2 x 10, 5 sec  2 x 10, 5 sec  NP     Seated chin nod  2x10, 5sec HEP       TA iso 1 x 10, 10 sec  1 x 10, 10 sec  1 x 10, 10 sec  2 x 10, 5"      TA BKFO cuff 2 x 10 Held HELD 2 x 10, 5"      Patient education SI belt NP       Supine hip add  2x10, 5 sec  NP NP 2 x 10, 5"     S/L hip hiker- B/L  2 x 10, 5 sec  NP NP     Prone multifidus iso    3 min  , 5 sec   3 min  , 5"     Prone TA with knee flexion   1 x 20 per side      B/L no money YTB  2 x 10, 5 sec  HELD 2 x 10, 5"     Prone glute set with feet elevated   1 x 20, 5 sec  1 x 20, 5"         Assessment: Patient reported improved comfort following treatment today  Difficulty observed and reported with TA activation exercises and with gluteal activation  Plan: Continue core and cervical stabilization

## 2018-02-06 NOTE — PROGRESS NOTES
Assessment/Plan: patient here today for 1 week follow up for back pian     No problem-specific Assessment & Plan notes found for this encounter  1  Hip pain, chronic, unspecified laterality     2  Strain of muscle, fascia and tendon of pelvis, subsequent encounter     3  Pelvic somatic dysfunction          There are no diagnoses linked to this encounter  Subjective:      Patient ID: Virgie Ricks is a 75 y o  female  Follow up  Improving, has 6 mm in right shoe with orthotics  Is have 4 mm added to bottom of shoe to make room inside shoe  Original injury was falling off a horse onto right side  ? Hips involved  Wearing the sacral belt, this helps patient states  The following portions of the patient's history were reviewed and updated as appropriate: allergies, current medications, past family history, past medical history, past social history, past surgical history and problem list     Review of Systems   Constitutional: Negative  Gastrointestinal: Negative  Musculoskeletal: Positive for back pain  Skin: Negative  Neurological: Negative  Psychiatric/Behavioral: Negative  Objective:     Physical Exam   Constitutional: She appears well-developed and well-nourished  Eyes: Conjunctivae are normal  Pupils are equal, round, and reactive to light  Musculoskeletal:   Stand: slight high on right, innominate

## 2018-02-07 ENCOUNTER — APPOINTMENT (OUTPATIENT)
Dept: PHYSICAL THERAPY | Facility: REHABILITATION | Age: 68
End: 2018-02-07
Payer: MEDICARE

## 2018-02-09 ENCOUNTER — APPOINTMENT (OUTPATIENT)
Dept: PHYSICAL THERAPY | Facility: REHABILITATION | Age: 68
End: 2018-02-09
Payer: MEDICARE

## 2018-02-09 ENCOUNTER — OFFICE VISIT (OUTPATIENT)
Dept: FAMILY MEDICINE CLINIC | Facility: CLINIC | Age: 68
End: 2018-02-09
Payer: MEDICARE

## 2018-02-09 VITALS
HEART RATE: 85 BPM | DIASTOLIC BLOOD PRESSURE: 86 MMHG | HEIGHT: 66 IN | SYSTOLIC BLOOD PRESSURE: 132 MMHG | OXYGEN SATURATION: 98 % | TEMPERATURE: 98.5 F

## 2018-02-09 DIAGNOSIS — M99.05 PELVIC SOMATIC DYSFUNCTION: ICD-10-CM

## 2018-02-09 DIAGNOSIS — G89.29 HIP PAIN, CHRONIC, RIGHT: Primary | ICD-10-CM

## 2018-02-09 DIAGNOSIS — S39.013A STRAIN OF MUSCLE, FASCIA AND TENDON OF PELVIS, INITIAL ENCOUNTER: ICD-10-CM

## 2018-02-09 DIAGNOSIS — S23.41XA RIB SPRAIN, INITIAL ENCOUNTER: ICD-10-CM

## 2018-02-09 DIAGNOSIS — M25.551 HIP PAIN, CHRONIC, RIGHT: Primary | ICD-10-CM

## 2018-02-09 DIAGNOSIS — M99.08 SOMATIC DYSFUNCTION OF RIB REGION: ICD-10-CM

## 2018-02-09 DIAGNOSIS — S16.1XXA CERVICAL STRAIN, INITIAL ENCOUNTER: ICD-10-CM

## 2018-02-09 DIAGNOSIS — M99.01 CERVICAL SOMATIC DYSFUNCTION: ICD-10-CM

## 2018-02-09 PROCEDURE — 98926 OSTEOPATH MANJ 3-4 REGIONS: CPT | Performed by: FAMILY MEDICINE

## 2018-02-09 PROCEDURE — 99214 OFFICE O/P EST MOD 30 MIN: CPT | Performed by: FAMILY MEDICINE

## 2018-02-09 NOTE — PROGRESS NOTES
Assessment/Plan: patient here today for back pian   Pt stated that she fell on ice     No problem-specific Assessment & Plan notes found for this encounter  1  Hip pain, chronic, right  XR hip/pelv 2-3 vws left if performed    XR hip/pelv 2-3 vws right if performed   2  Cervical somatic dysfunction     3  Cervical strain, initial encounter     4  Pelvic somatic dysfunction     5  Strain of muscle, fascia and tendon of pelvis, initial encounter     6  Rib sprain, initial encounter     7  Somatic dysfunction of rib region          There are no diagnoses linked to this encounter  Subjective:      Patient ID: Radha Kimball is a 75 y o  female  Slipped on ice Wednesday night and torqued body, did not hit ground  Low back and neck pain discomfort  PE  Demonstrates left superior innominate without foot lifts standing  Leg length study demonstrates short left leg  The following portions of the patient's history were reviewed and updated as appropriate: allergies, current medications, past family history, past medical history, past social history, past surgical history and problem list     Review of Systems   Constitutional: Negative  HENT: Negative  Respiratory: Negative  Musculoskeletal: Positive for back pain  Right neck discomfort  Objective:    Vitals:    02/09/18 1032   BP: 132/86   Pulse: 85   Temp: 98 5 °F (36 9 °C)   SpO2: 98%        Physical Exam   Constitutional: She is oriented to person, place, and time  She appears well-developed and well-nourished  Neck: Normal range of motion  Musculoskeletal: She exhibits tenderness  Stand: Pelvis level with 4 mm foot lifts right shoe  Prone: Great troch higher in socket compared to left  Superior part of right great is tender to palpation  Sit: Rib #1 right slight anterior  Supine: Cervical tight with few rotations to right  Soft tissue around PSIS on right, remains tender     Neurological: She is alert and oriented to person, place, and time  Skin: Skin is warm and dry  Psychiatric: She has a normal mood and affect   Her behavior is normal

## 2018-02-09 NOTE — PATIENT INSTRUCTIONS
XR both hip and follow up as scheduled  Unfold upper thorax/first rib, cervical and ME to pelvis  Could be an anomaly of Acetabular location in pelvis

## 2018-02-12 ENCOUNTER — APPOINTMENT (OUTPATIENT)
Dept: PHYSICAL THERAPY | Facility: REHABILITATION | Age: 68
End: 2018-02-12
Payer: MEDICARE

## 2018-02-13 ENCOUNTER — HOSPITAL ENCOUNTER (OUTPATIENT)
Dept: RADIOLOGY | Facility: HOSPITAL | Age: 68
Discharge: HOME | End: 2018-02-13
Payer: MEDICARE

## 2018-02-13 ENCOUNTER — TRANSCRIBE ORDERS (OUTPATIENT)
Dept: RADIOLOGY | Facility: HOSPITAL | Age: 68
End: 2018-02-13

## 2018-02-13 DIAGNOSIS — G89.29 HIP PAIN, CHRONIC, RIGHT: ICD-10-CM

## 2018-02-13 DIAGNOSIS — M25.551 HIP PAIN, CHRONIC, RIGHT: ICD-10-CM

## 2018-02-13 PROCEDURE — 73523 X-RAY EXAM HIPS BI 5/> VIEWS: CPT

## 2018-02-14 ENCOUNTER — OFFICE VISIT (OUTPATIENT)
Dept: FAMILY MEDICINE CLINIC | Facility: CLINIC | Age: 68
End: 2018-02-14
Payer: MEDICARE

## 2018-02-14 VITALS
DIASTOLIC BLOOD PRESSURE: 88 MMHG | SYSTOLIC BLOOD PRESSURE: 146 MMHG | HEART RATE: 80 BPM | HEIGHT: 66 IN | TEMPERATURE: 98.2 F | OXYGEN SATURATION: 99 %

## 2018-02-14 DIAGNOSIS — S86.919A MUSCLE STRAIN OF LOWER EXTREMITY, INITIAL ENCOUNTER: Primary | ICD-10-CM

## 2018-02-14 PROCEDURE — 99213 OFFICE O/P EST LOW 20 MIN: CPT | Performed by: FAMILY MEDICINE

## 2018-02-14 RX ORDER — OSELTAMIVIR PHOSPHATE 75 MG/1
75 CAPSULE ORAL EVERY 12 HOURS SCHEDULED
COMMUNITY
End: 2018-04-16

## 2018-02-14 NOTE — PATIENT INSTRUCTIONS
No heat to sore area  Ice only  Continue Pt  Little too sore for manipulation  Will adjust structure next visit

## 2018-02-14 NOTE — PROGRESS NOTES
Assessment/Plan:patient here today for falling on ice and to talk about xrays     No problem-specific Assessment & Plan notes found for this encounter  1  Muscle strain of lower extremity, initial encounter            There are no diagnoses linked to this encounter  Subjective:      Patient ID: Emy Castillo is a 75 y o  female  Slipped ice skating  Discomfort along low back, hip-  soft tissue soreness  The following portions of the patient's history were reviewed and updated as appropriate: allergies, current medications, past family history, past medical history, past social history, past surgical history and problem list     Review of Systems   Constitutional: Negative  Genitourinary: Negative  Musculoskeletal: Positive for back pain  Neurological: Negative  Psychiatric/Behavioral: Negative  Objective:    Vitals:    02/14/18 0824   BP: 146/88   Pulse: 80   Temp: 98 2 °F (36 8 °C)   SpO2: 99%        Physical Exam   Constitutional: She is oriented to person, place, and time  She appears well-developed and well-nourished  Musculoskeletal:   Soft tissue soreness lumbar - right hip area  Neurological: She is alert and oriented to person, place, and time  She has normal reflexes

## 2018-02-16 ENCOUNTER — APPOINTMENT (OUTPATIENT)
Dept: PHYSICAL THERAPY | Facility: REHABILITATION | Age: 68
End: 2018-02-16
Payer: MEDICARE

## 2018-02-19 ENCOUNTER — APPOINTMENT (OUTPATIENT)
Dept: PHYSICAL THERAPY | Facility: REHABILITATION | Age: 68
End: 2018-02-19
Payer: MEDICARE

## 2018-02-20 ENCOUNTER — OFFICE VISIT (OUTPATIENT)
Dept: FAMILY MEDICINE CLINIC | Facility: CLINIC | Age: 68
End: 2018-02-20
Payer: MEDICARE

## 2018-02-20 VITALS
OXYGEN SATURATION: 98 % | DIASTOLIC BLOOD PRESSURE: 84 MMHG | SYSTOLIC BLOOD PRESSURE: 136 MMHG | HEART RATE: 109 BPM | TEMPERATURE: 98.1 F | HEIGHT: 64 IN

## 2018-02-20 DIAGNOSIS — M99.05 PELVIC SOMATIC DYSFUNCTION: ICD-10-CM

## 2018-02-20 DIAGNOSIS — S39.013D STRAIN OF MUSCLE, FASCIA AND TENDON OF PELVIS, SUBSEQUENT ENCOUNTER: ICD-10-CM

## 2018-02-20 DIAGNOSIS — M21.70 ACQUIRED UNEQUAL LEG LENGTH: Primary | ICD-10-CM

## 2018-02-20 PROCEDURE — 98925 OSTEOPATH MANJ 1-2 REGIONS: CPT | Performed by: FAMILY MEDICINE

## 2018-02-20 NOTE — PROGRESS NOTES
Assessment/Plan: patient here today for follow up for lower back pain     No problem-specific Assessment & Plan notes found for this encounter  1  Acquired unequal leg length     2  Pelvic somatic dysfunction     3  Strain of muscle, fascia and tendon of pelvis, subsequent encounter            There are no diagnoses linked to this encounter  Subjective:      Patient ID: Jenniffer Batista is a 75 y o  female  Follow up back  Much improved, able to do more activities  Returning to PT next Monday  The following portions of the patient's history were reviewed and updated as appropriate: allergies, current medications, past family history, past medical history, past social history, past surgical history and problem list     Review of Systems   Constitutional: Negative  Respiratory: Negative  Musculoskeletal: Positive for back pain  Neurological: Negative  Psychiatric/Behavioral: Negative  Objective:      /84 (BP Location: Left arm, Patient Position: Sitting, Cuff Size: Standard)   Pulse (!) 109   Temp 98 1 °F (36 7 °C) (Oral)   Ht 5' 3 75" (1 619 m)   LMP  (LMP Unknown)   SpO2 98%          Physical Exam   Constitutional: She is oriented to person, place, and time  She appears well-developed and well-nourished  HENT:   Head: Normocephalic  Musculoskeletal:   Without foot lifts standing: Inferior right innominate  Tender around right PSIS  Neurological: She is alert and oriented to person, place, and time

## 2018-02-23 ENCOUNTER — OFFICE VISIT (OUTPATIENT)
Dept: FAMILY MEDICINE CLINIC | Facility: CLINIC | Age: 68
End: 2018-02-23
Payer: MEDICARE

## 2018-02-23 ENCOUNTER — APPOINTMENT (OUTPATIENT)
Dept: PHYSICAL THERAPY | Facility: REHABILITATION | Age: 68
End: 2018-02-23
Payer: MEDICARE

## 2018-02-23 VITALS
OXYGEN SATURATION: 98 % | DIASTOLIC BLOOD PRESSURE: 88 MMHG | SYSTOLIC BLOOD PRESSURE: 152 MMHG | TEMPERATURE: 97.8 F | WEIGHT: 177 LBS | HEART RATE: 84 BPM | BODY MASS INDEX: 28.45 KG/M2 | HEIGHT: 66 IN

## 2018-02-23 DIAGNOSIS — S39.012A SACROILIAC STRAIN, INITIAL ENCOUNTER: ICD-10-CM

## 2018-02-23 DIAGNOSIS — S86.919A MUSCLE STRAIN OF LOWER EXTREMITY, INITIAL ENCOUNTER: ICD-10-CM

## 2018-02-23 DIAGNOSIS — M99.04 SACRAL REGION SOMATIC DYSFUNCTION: Primary | ICD-10-CM

## 2018-02-23 DIAGNOSIS — M99.05 PELVIC SOMATIC DYSFUNCTION: ICD-10-CM

## 2018-02-23 DIAGNOSIS — S23.41XD RIB SPRAIN, SUBSEQUENT ENCOUNTER: ICD-10-CM

## 2018-02-23 DIAGNOSIS — M99.08 SOMATIC DYSFUNCTION OF RIB REGION: ICD-10-CM

## 2018-02-23 DIAGNOSIS — S39.013A STRAIN OF MUSCLE, FASCIA AND TENDON OF PELVIS, INITIAL ENCOUNTER: ICD-10-CM

## 2018-02-23 PROCEDURE — 98926 OSTEOPATH MANJ 3-4 REGIONS: CPT | Performed by: FAMILY MEDICINE

## 2018-02-23 NOTE — PROGRESS NOTES
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  Neck and low back  Pain / pulling down back of legs  Raking yard was he hardest         The following portions of the patient's history were reviewed and updated as appropriate: allergies, current medications, past family history, past medical history, past social history, past surgical history and problem list     Review of Systems   Constitutional: Negative  Musculoskeletal: Positive for back pain and neck pain  Skin: Negative  Neurological: Negative  Objective:      /88 (BP Location: Left arm, Patient Position: Sitting, Cuff Size: Large)   Pulse 84   Temp 97 8 °F (36 6 °C) (Oral)   Ht 5' 5 75" (1 67 m)   Wt 80 3 kg (177 lb)   LMP  (LMP Unknown)   SpO2 98%   BMI 28 79 kg/m²          Physical Exam   Constitutional: She is oriented to person, place, and time  She appears well-developed and well-nourished  Musculoskeletal:   Stand: inferior right innominate, prone: inferior left PSIS, sacrum deep and tneder on right  Rib #1 right up and tender  Hamstrings tight and sore  Neurological: She is alert and oriented to person, place, and time

## 2018-02-23 NOTE — PATIENT INSTRUCTIONS
Stretched lumbar, unfold pelvis, sacrum and rib  Stretched cervical   Recheck level, decreased pain  When working in yard, work for 10 15 minutes and take breaks,  Do this  Frequently

## 2018-02-26 ENCOUNTER — APPOINTMENT (OUTPATIENT)
Dept: PHYSICAL THERAPY | Facility: REHABILITATION | Age: 68
End: 2018-02-26
Payer: MEDICARE

## 2018-02-28 ENCOUNTER — OFFICE VISIT (OUTPATIENT)
Dept: FAMILY MEDICINE CLINIC | Facility: CLINIC | Age: 68
End: 2018-02-28
Payer: MEDICARE

## 2018-02-28 VITALS
HEIGHT: 66 IN | HEART RATE: 82 BPM | SYSTOLIC BLOOD PRESSURE: 144 MMHG | TEMPERATURE: 98 F | WEIGHT: 176 LBS | DIASTOLIC BLOOD PRESSURE: 86 MMHG | OXYGEN SATURATION: 98 % | BODY MASS INDEX: 28.28 KG/M2

## 2018-02-28 DIAGNOSIS — M79.18 MUSCLE ACHE OF EXTREMITY: ICD-10-CM

## 2018-02-28 DIAGNOSIS — S23.41XA RIB SPRAIN, INITIAL ENCOUNTER: Primary | ICD-10-CM

## 2018-02-28 DIAGNOSIS — M99.08 SOMATIC DYSFUNCTION OF RIB REGION: ICD-10-CM

## 2018-02-28 DIAGNOSIS — S16.1XXA CERVICAL STRAIN, INITIAL ENCOUNTER: ICD-10-CM

## 2018-02-28 DIAGNOSIS — S29.012A STRAIN OF MUSCLE AND TENDON OF BACK WALL OF THORAX, INITIAL ENCOUNTER: ICD-10-CM

## 2018-02-28 DIAGNOSIS — M99.02 SOMATIC DYSFUNCTION OF THORACIC REGION: ICD-10-CM

## 2018-02-28 DIAGNOSIS — M99.01 CERVICAL SOMATIC DYSFUNCTION: ICD-10-CM

## 2018-02-28 PROCEDURE — 99214 OFFICE O/P EST MOD 30 MIN: CPT | Performed by: FAMILY MEDICINE

## 2018-02-28 PROCEDURE — 98926 OSTEOPATH MANJ 3-4 REGIONS: CPT | Performed by: FAMILY MEDICINE

## 2018-02-28 RX ORDER — SERTRALINE HYDROCHLORIDE 100 MG/1
250 TABLET, FILM COATED ORAL
COMMUNITY
Start: 2018-02-12

## 2018-02-28 RX ORDER — URSODIOL 500 MG/1
TABLET, FILM COATED ORAL
COMMUNITY
Start: 2018-02-12 | End: 2018-04-16

## 2018-02-28 NOTE — PROGRESS NOTES
Assessment/Plan: patient here today for left shoulder pain      No problem-specific Assessment & Plan notes found for this encounter  1  Rib sprain, initial encounter     2  Somatic dysfunction of rib region     3  Somatic dysfunction of thoracic region     4  Strain of muscle and tendon of back wall of thorax, initial encounter     5  Cervical somatic dysfunction     6  Cervical strain, initial encounter     7  Muscle ache of extremity            There are no diagnoses linked to this encounter  Subjective:      Patient ID: Darrall Frankel is a 75 y o  female  Exercising at gym with weights  Extending joints and hurt upper thorax, para BL shoulders soft tissue  The following portions of the patient's history were reviewed and updated as appropriate: allergies, current medications, past family history, past medical history, past social history, past surgical history and problem list     Review of Systems   Constitutional: Negative  Cardiovascular: Negative  Gastrointestinal: Negative  Musculoskeletal: Positive for neck pain  Neurological: Negative  Objective:      /86 (BP Location: Left arm, Patient Position: Sitting, Cuff Size: Large)   Pulse 82   Temp 98 °F (36 7 °C) (Oral)   Ht 5' 5 75" (1 67 m)   Wt 79 8 kg (176 lb)   LMP  (LMP Unknown)   SpO2 98%   BMI 28 62 kg/m²          Physical Exam   Constitutional: She appears well-developed and well-nourished  Cardiovascular: Normal rate and regular rhythm  Pulmonary/Chest: Effort normal and breath sounds normal    Musculoskeletal:   Rib #1 left up and posterior, T1 SB and rotated right, few cervical foldings  Tender BL anterior deltoids

## 2018-02-28 NOTE — PATIENT INSTRUCTIONS
Unfold rib, upper thorax and cervical   Soft tissue to BL superior trap into cervical   Discussed strenuous exercise and not to extend joints

## 2018-03-05 ENCOUNTER — OFFICE VISIT (OUTPATIENT)
Dept: FAMILY MEDICINE CLINIC | Facility: CLINIC | Age: 68
End: 2018-03-05
Payer: MEDICARE

## 2018-03-05 VITALS
DIASTOLIC BLOOD PRESSURE: 84 MMHG | HEIGHT: 66 IN | TEMPERATURE: 97.8 F | HEART RATE: 78 BPM | SYSTOLIC BLOOD PRESSURE: 146 MMHG | OXYGEN SATURATION: 98 % | BODY MASS INDEX: 28.12 KG/M2 | WEIGHT: 175 LBS

## 2018-03-05 DIAGNOSIS — M54.50 BILATERAL LOW BACK PAIN WITHOUT SCIATICA, UNSPECIFIED CHRONICITY: ICD-10-CM

## 2018-03-05 DIAGNOSIS — S39.012D STRAIN, SACRAL, SUBSEQUENT ENCOUNTER: ICD-10-CM

## 2018-03-05 DIAGNOSIS — M99.07 UPPER EXTREMITY SOMATIC DYSFUNCTION: ICD-10-CM

## 2018-03-05 DIAGNOSIS — M25.512 ACUTE PAIN OF LEFT SHOULDER: ICD-10-CM

## 2018-03-05 DIAGNOSIS — M99.04 SACRAL REGION SOMATIC DYSFUNCTION: Primary | ICD-10-CM

## 2018-03-05 DIAGNOSIS — S39.012A LUMBAR STRAIN, INITIAL ENCOUNTER: ICD-10-CM

## 2018-03-05 DIAGNOSIS — M99.03 SEGMENTAL AND SOMATIC DYSFUNCTION OF LUMBAR REGION: ICD-10-CM

## 2018-03-05 DIAGNOSIS — S46.919A MUSCLE STRAIN OF UPPER EXTREMITY, INITIAL ENCOUNTER: ICD-10-CM

## 2018-03-05 PROCEDURE — 98926 OSTEOPATH MANJ 3-4 REGIONS: CPT | Performed by: FAMILY MEDICINE

## 2018-03-05 NOTE — PATIENT INSTRUCTIONS
Stretched BL LSI directional   Stretched left upper extremity from Bicep to superior trap  Stretched cervical in inferior direction  Recheck, back and neck feel much better

## 2018-03-05 NOTE — PROGRESS NOTES
Assessment/Plan: patient here today for 2 week follow up for back pain     No problem-specific Assessment & Plan notes found for this encounter  1  Sacral region somatic dysfunction     2  Strain, sacral, subsequent encounter     3  Muscle strain of upper extremity, initial encounter     4  Upper extremity somatic dysfunction     5  Lumbar strain, initial encounter     6  Segmental and somatic dysfunction of lumbar region     7  Acute pain of left shoulder     8  Bilateral low back pain without sciatica, unspecified chronicity            There are no diagnoses linked to this encounter  Subjective:      Patient ID: Don Nugent is a 75 y o  female  Sacral pain started after exercising  Also left shoulder and right posterior lateral cervical         The following portions of the patient's history were reviewed and updated as appropriate: allergies, current medications, past family history, past medical history, past social history, past surgical history and problem list     Review of Systems   Constitutional: Negative  HENT: Negative  Respiratory: Negative  Cardiovascular: Negative  Gastrointestinal: Negative  Musculoskeletal: Positive for back pain, neck pain and neck stiffness  Skin: Negative  Neurological: Negative  Psychiatric/Behavioral: Negative  Objective:      /84 (BP Location: Left arm, Patient Position: Sitting, Cuff Size: Standard)   Pulse 78   Temp 97 8 °F (36 6 °C) (Oral)   Ht 5' 5 75" (1 67 m)   Wt 79 4 kg (175 lb)   LMP  (LMP Unknown)   SpO2 98%   BMI 28 46 kg/m²          Physical Exam   Constitutional: She is oriented to person, place, and time  She appears well-developed and well-nourished  HENT:   Head: Normocephalic and atraumatic  Cardiovascular: Normal rate  Pulmonary/Chest: Effort normal    Musculoskeletal:   Soft tissue increased tension right superior trap to occiput  Left Anterior deltoid , and bicep tendon    Sacral area: Tissue increased tension and tender BL LSI area  Rib #1 equal BL  Neurological: She is alert and oriented to person, place, and time  Skin: Skin is warm and dry

## 2018-03-09 ENCOUNTER — OFFICE VISIT (OUTPATIENT)
Dept: FAMILY MEDICINE CLINIC | Facility: CLINIC | Age: 68
End: 2018-03-09
Payer: MEDICARE

## 2018-03-09 VITALS
OXYGEN SATURATION: 98 % | TEMPERATURE: 97.8 F | BODY MASS INDEX: 28.12 KG/M2 | HEART RATE: 75 BPM | HEIGHT: 66 IN | SYSTOLIC BLOOD PRESSURE: 146 MMHG | WEIGHT: 175 LBS | DIASTOLIC BLOOD PRESSURE: 88 MMHG

## 2018-03-09 DIAGNOSIS — M54.2 NECK PAIN ON RIGHT SIDE: Primary | ICD-10-CM

## 2018-03-09 DIAGNOSIS — S23.41XA RIB SPRAIN, INITIAL ENCOUNTER: ICD-10-CM

## 2018-03-09 DIAGNOSIS — M99.01 CERVICAL SOMATIC DYSFUNCTION: ICD-10-CM

## 2018-03-09 DIAGNOSIS — M99.08 SOMATIC DYSFUNCTION OF RIB REGION: ICD-10-CM

## 2018-03-09 DIAGNOSIS — S39.012A STRAIN, SACRAL, INITIAL ENCOUNTER: ICD-10-CM

## 2018-03-09 DIAGNOSIS — M54.50 ACUTE LOW BACK PAIN WITHOUT SCIATICA, UNSPECIFIED BACK PAIN LATERALITY: ICD-10-CM

## 2018-03-09 DIAGNOSIS — S16.1XXD CERVICAL STRAIN, SUBSEQUENT ENCOUNTER: ICD-10-CM

## 2018-03-09 DIAGNOSIS — M99.04 SACRAL REGION SOMATIC DYSFUNCTION: ICD-10-CM

## 2018-03-09 PROCEDURE — 98926 OSTEOPATH MANJ 3-4 REGIONS: CPT | Performed by: FAMILY MEDICINE

## 2018-03-09 NOTE — PROGRESS NOTES
Assessment/Plan: patient here today for flower back pain,neck and shoulder pain     No problem-specific Assessment & Plan notes found for this encounter  1  Neck pain on right side     2  Acute low back pain without sciatica, unspecified back pain laterality     3  Rib sprain, initial encounter     4  Somatic dysfunction of rib region     5  Cervical somatic dysfunction     6  Cervical strain, subsequent encounter     7  Sacral region somatic dysfunction     8  Strain, sacral, initial encounter            There are no diagnoses linked to this encounter  Subjective:      Patient ID: Charito Peña is a 75 y o  female  Back hurts laying on either side  Right posterior lateral cervical hurts  Wearing shoes without foot lifts  May of hurt self exercising  The following portions of the patient's history were reviewed and updated as appropriate: allergies, current medications, past family history, past medical history, past social history, past surgical history and problem list     Review of Systems   Constitutional: Negative  Respiratory: Negative  Cardiovascular: Negative  Musculoskeletal: Positive for back pain, neck pain and neck stiffness  Objective:      /88 (BP Location: Left arm, Patient Position: Sitting, Cuff Size: Standard)   Pulse 75   Temp 97 8 °F (36 6 °C) (Oral)   Ht 5' 5 75" (1 67 m)   Wt 79 4 kg (175 lb)   LMP  (LMP Unknown)   SpO2 98%   BMI 28 46 kg/m²          Physical Exam   Constitutional: She is oriented to person, place, and time  She appears well-developed and well-nourished  Pulmonary/Chest: Effort normal    Musculoskeletal: She exhibits tenderness  She exhibits no edema or deformity  Rib #1 slight posterior and tender  Few lower cervical foldings  Prone: PSIS level  Tender soft tissue left LSI with increased spasms  Neurological: She is alert and oriented to person, place, and time  Skin: Skin is warm and dry     Psychiatric: She has a normal mood and affect   Her behavior is normal  Judgment and thought content normal

## 2018-03-09 NOTE — PATIENT INSTRUCTIONS
Unfold rib, cervical and stretched LSI area  Use the foot lifts with all the shoes  Pelvis is level

## 2018-03-12 ENCOUNTER — OFFICE VISIT (OUTPATIENT)
Dept: FAMILY MEDICINE CLINIC | Facility: CLINIC | Age: 68
End: 2018-03-12
Payer: MEDICARE

## 2018-03-12 VITALS
HEIGHT: 66 IN | DIASTOLIC BLOOD PRESSURE: 82 MMHG | SYSTOLIC BLOOD PRESSURE: 146 MMHG | TEMPERATURE: 98.6 F | HEART RATE: 85 BPM | OXYGEN SATURATION: 98 %

## 2018-03-12 DIAGNOSIS — M99.04 SACRAL REGION SOMATIC DYSFUNCTION: ICD-10-CM

## 2018-03-12 DIAGNOSIS — S39.012D STRAIN, SACRAL, SUBSEQUENT ENCOUNTER: ICD-10-CM

## 2018-03-12 DIAGNOSIS — M53.3 SACRO-ILIAC PAIN: Primary | ICD-10-CM

## 2018-03-12 PROCEDURE — 99213 OFFICE O/P EST LOW 20 MIN: CPT | Performed by: FAMILY MEDICINE

## 2018-03-12 PROCEDURE — 98925 OSTEOPATH MANJ 1-2 REGIONS: CPT | Performed by: FAMILY MEDICINE

## 2018-03-12 NOTE — PROGRESS NOTES
Assessment/Plan: patient here today for back pain     No problem-specific Assessment & Plan notes found for this encounter  1  Sacro-iliac pain  Ambulatory referral to Physical Medicine Rehab          There are no diagnoses linked to this encounter  Subjective:      Patient ID: Netta Rowley is a 75 y o  female  Right low back  Has foot lifts in today, right shoe  The following portions of the patient's history were reviewed and updated as appropriate: allergies, current medications, past family history, past medical history, past social history, past surgical history and problem list     Review of Systems      Objective:      /82 (BP Location: Left arm, Patient Position: Sitting, Cuff Size: Standard)   Pulse 85   Temp 98 6 °F (37 °C) (Oral)   Ht 5' 5 75" (1 67 m)   LMP  (LMP Unknown)   SpO2 98%          Physical Exam   Musculoskeletal:   Standing and Prone: Pelvis level  Tender soft tissue over superior part of right SI joint

## 2018-03-12 NOTE — PATIENT INSTRUCTIONS
Stretched fibers over SI from Superior to Inferior  Recheck, feels considerably better  Has had this pain for years  Physiatry evaluation and treatment  Wear the foot lifts  Stretched right CT junction

## 2018-04-16 ENCOUNTER — OFFICE VISIT (OUTPATIENT)
Dept: FAMILY MEDICINE CLINIC | Facility: CLINIC | Age: 68
End: 2018-04-16
Payer: MEDICARE

## 2018-04-16 VITALS
DIASTOLIC BLOOD PRESSURE: 84 MMHG | HEART RATE: 78 BPM | HEIGHT: 66 IN | TEMPERATURE: 98.3 F | SYSTOLIC BLOOD PRESSURE: 136 MMHG | OXYGEN SATURATION: 98 %

## 2018-04-16 DIAGNOSIS — M99.02 SOMATIC DYSFUNCTION OF THORACIC REGION: ICD-10-CM

## 2018-04-16 DIAGNOSIS — M99.08 SEGMENTAL AND SOMATIC DYSFUNCTION OF RIB CAGE: ICD-10-CM

## 2018-04-16 DIAGNOSIS — M99.01 CERVICAL SOMATIC DYSFUNCTION: ICD-10-CM

## 2018-04-16 DIAGNOSIS — G44.209 TENSION HEADACHE: ICD-10-CM

## 2018-04-16 DIAGNOSIS — S16.1XXA CERVICAL STRAIN, INITIAL ENCOUNTER: ICD-10-CM

## 2018-04-16 DIAGNOSIS — S29.012A STRAIN OF MUSCLE AND TENDON OF BACK WALL OF THORAX, INITIAL ENCOUNTER: ICD-10-CM

## 2018-04-16 DIAGNOSIS — S23.41XA RIB SPRAIN, INITIAL ENCOUNTER: ICD-10-CM

## 2018-04-16 DIAGNOSIS — M54.2 NECK PAIN ON RIGHT SIDE: Primary | ICD-10-CM

## 2018-04-16 PROCEDURE — 99214 OFFICE O/P EST MOD 30 MIN: CPT | Performed by: FAMILY MEDICINE

## 2018-04-16 PROCEDURE — 98926 OSTEOPATH MANJ 3-4 REGIONS: CPT | Performed by: FAMILY MEDICINE

## 2018-04-16 NOTE — PROGRESS NOTES
Assessment/Plan: patient here today for neck pian     No problem-specific Assessment & Plan notes found for this encounter  1  Neck pain on right side     2  Tension headache     3  Cervical somatic dysfunction     4  Cervical strain, initial encounter     5  Rib sprain, initial encounter     6  Segmental and somatic dysfunction of rib cage     7  Somatic dysfunction of thoracic region     8  Strain of muscle and tendon of back wall of thorax, initial encounter            There are no diagnoses linked to this encounter  Subjective:      Patient ID: Sara Haro is a 75 y o  female  Neck discomfort  Right CT junction area that travels up to top of head to above eye brows, past 2 weeks  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 [employer] Wednesday  The following portions of the patient's history were reviewed and updated as appropriate: allergies, current medications, past family history, past medical history, past social history, past surgical history and problem list     Review of Systems   Constitutional: Negative  HENT: Negative  Respiratory: Negative  Cardiovascular: Negative  Gastrointestinal: Negative  Genitourinary: Negative  Musculoskeletal: Positive for neck pain  Skin: Negative  Neurological: Positive for headaches  Psychiatric/Behavioral: Negative  Objective:      /84 (BP Location: Left arm, Patient Position: Sitting, Cuff Size: Standard)   Pulse 78   Temp 98 3 °F (36 8 °C) (Oral)   Ht 5' 5 75" (1 67 m)   LMP  (LMP Unknown)   SpO2 98%          Physical Exam   Constitutional: She is oriented to person, place, and time  She appears well-developed and well-nourished  HENT:   Head: Normocephalic and atraumatic  Cardiovascular: Normal rate  Pulmonary/Chest: Effort normal    Musculoskeletal:   Rib #1 right up and slight anterior, T1 SB and rotated right  Soft tissue cervical tissue spasms and tension and tender  Lower cervical opposite T1     Neurological: She is alert and oriented to person, place, and time  Skin: Skin is warm and dry  Psychiatric: She has a normal mood and affect   Her behavior is normal  Judgment and thought content normal

## 2018-04-16 NOTE — PATIENT INSTRUCTIONS
Unfold upper thorax, upper ribs and cervical stretching in superior direction  Recheck, HA and neck pain easing up  No heat  F/U 48 hours

## 2018-04-18 ENCOUNTER — OFFICE VISIT (OUTPATIENT)
Dept: FAMILY MEDICINE CLINIC | Facility: CLINIC | Age: 68
End: 2018-04-18
Payer: MEDICARE

## 2018-04-18 VITALS
HEIGHT: 66 IN | DIASTOLIC BLOOD PRESSURE: 76 MMHG | SYSTOLIC BLOOD PRESSURE: 138 MMHG | TEMPERATURE: 97.9 F | HEART RATE: 84 BPM | OXYGEN SATURATION: 98 %

## 2018-04-18 DIAGNOSIS — S39.013A STRAIN OF MUSCLE, FASCIA AND TENDON OF PELVIS, INITIAL ENCOUNTER: ICD-10-CM

## 2018-04-18 DIAGNOSIS — M99.05 PELVIC SOMATIC DYSFUNCTION: ICD-10-CM

## 2018-04-18 DIAGNOSIS — M54.2 NECK PAIN ON RIGHT SIDE: Primary | ICD-10-CM

## 2018-04-18 DIAGNOSIS — S23.41XD RIB SPRAIN, SUBSEQUENT ENCOUNTER: ICD-10-CM

## 2018-04-18 DIAGNOSIS — M99.01 CERVICAL SOMATIC DYSFUNCTION: ICD-10-CM

## 2018-04-18 DIAGNOSIS — M99.02 SOMATIC DYSFUNCTION OF THORACIC REGION: ICD-10-CM

## 2018-04-18 DIAGNOSIS — S16.1XXA CERVICAL STRAIN, INITIAL ENCOUNTER: ICD-10-CM

## 2018-04-18 DIAGNOSIS — S29.012D STRAIN OF MUSCLE AND TENDON OF BACK WALL OF THORAX, SUBSEQUENT ENCOUNTER: ICD-10-CM

## 2018-04-18 DIAGNOSIS — S39.012A STRAIN, SACRAL, INITIAL ENCOUNTER: ICD-10-CM

## 2018-04-18 DIAGNOSIS — M99.04 SACRAL REGION SOMATIC DYSFUNCTION: ICD-10-CM

## 2018-04-18 DIAGNOSIS — M54.50 ACUTE BILATERAL LOW BACK PAIN WITHOUT SCIATICA: ICD-10-CM

## 2018-04-18 DIAGNOSIS — M99.08 SEGMENTAL AND SOMATIC DYSFUNCTION OF RIB CAGE: ICD-10-CM

## 2018-04-18 PROCEDURE — 99215 OFFICE O/P EST HI 40 MIN: CPT | Performed by: FAMILY MEDICINE

## 2018-04-18 PROCEDURE — 98927 OSTEOPATH MANJ 5-6 REGIONS: CPT | Performed by: FAMILY MEDICINE

## 2018-04-18 NOTE — PROGRESS NOTES
Assessment/Plan: patient here today for follow up for neck pain and back pain     No problem-specific Assessment & Plan notes found for this encounter  1  Neck pain on right side     2  Acute bilateral low back pain without sciatica     3  Cervical somatic dysfunction     4  Cervical strain, initial encounter     5  Rib sprain, subsequent encounter     6  Segmental and somatic dysfunction of rib cage     7  Somatic dysfunction of thoracic region     8  Strain of muscle and tendon of back wall of thorax, subsequent encounter     9  Pelvic somatic dysfunction     10  Strain of muscle, fascia and tendon of pelvis, initial encounter     11  Sacral region somatic dysfunction     12  Strain, sacral, initial encounter          There are no diagnoses linked to this encounter  Subjective:      Patient ID: Anita Schultz is a 75 y o  female  Neck stiff and has localized low back pain  Able to take walks  Neck discomfort decreased  Lv for [employer]  The following portions of the patient's history were reviewed and updated as appropriate: allergies, current medications, past family history, past medical history, past social history, past surgical history and problem list     Review of Systems   Constitutional: Negative  Respiratory: Negative  Cardiovascular: Negative  Musculoskeletal: Positive for back pain and neck stiffness  Negative for gait problem  Skin: Negative  Neurological: Negative  Psychiatric/Behavioral: Negative  Objective:      /76 (BP Location: Left arm, Patient Position: Sitting, Cuff Size: Large)   Pulse 84   Temp 97 9 °F (36 6 °C) (Oral)   Ht 5' 5 75" (1 67 m)   LMP  (LMP Unknown)   SpO2 98%          Physical Exam   Constitutional: She appears well-developed and well-nourished  Cardiovascular: Normal rate and regular rhythm      Pulmonary/Chest: Effort normal and breath sounds normal    Musculoskeletal:   Rib #1 right posterior, neck soft tissue with increased tension and tender  T1 SB r, rotated left  Prone: Superior right PSIS, tender over superior right SI joint  Muscle spasms lumbar thorax junction with tenderness

## 2018-04-20 NOTE — PATIENT INSTRUCTIONS
Unfold upper thorax and ribs  ME to cervical follow by cervical stretching inferior direction  ME to pelvis, lumbar and lower thorax region  Recheck, neck and back discomfort decreased  45 minutes spent with patient  Discussed physical activity  Best not to stay in one position too long while traveling, on plane, up and take walks

## 2018-04-27 ENCOUNTER — OFFICE VISIT (OUTPATIENT)
Dept: FAMILY MEDICINE CLINIC | Facility: CLINIC | Age: 68
End: 2018-04-27
Payer: MEDICARE

## 2018-04-27 VITALS
SYSTOLIC BLOOD PRESSURE: 146 MMHG | HEART RATE: 95 BPM | TEMPERATURE: 98.2 F | DIASTOLIC BLOOD PRESSURE: 82 MMHG | HEIGHT: 66 IN | OXYGEN SATURATION: 98 %

## 2018-04-27 DIAGNOSIS — M99.01 CERVICAL SOMATIC DYSFUNCTION: ICD-10-CM

## 2018-04-27 DIAGNOSIS — S29.012A STRAIN OF MUSCLE AND TENDON OF BACK WALL OF THORAX, INITIAL ENCOUNTER: ICD-10-CM

## 2018-04-27 DIAGNOSIS — M99.02 SOMATIC DYSFUNCTION OF THORACIC REGION: ICD-10-CM

## 2018-04-27 DIAGNOSIS — S16.1XXA CERVICAL STRAIN, INITIAL ENCOUNTER: ICD-10-CM

## 2018-04-27 DIAGNOSIS — M54.9 UPPER BACK PAIN ON LEFT SIDE: ICD-10-CM

## 2018-04-27 DIAGNOSIS — S23.41XA RIB SPRAIN, INITIAL ENCOUNTER: ICD-10-CM

## 2018-04-27 DIAGNOSIS — M99.08 SOMATIC DYSFUNCTION OF RIB REGION: ICD-10-CM

## 2018-04-27 DIAGNOSIS — M54.2 NECK PAIN ON RIGHT SIDE: Primary | ICD-10-CM

## 2018-04-27 PROCEDURE — 99214 OFFICE O/P EST MOD 30 MIN: CPT | Performed by: FAMILY MEDICINE

## 2018-04-27 PROCEDURE — 98926 OSTEOPATH MANJ 3-4 REGIONS: CPT | Performed by: FAMILY MEDICINE

## 2018-04-30 ENCOUNTER — OFFICE VISIT (OUTPATIENT)
Dept: FAMILY MEDICINE CLINIC | Facility: CLINIC | Age: 68
End: 2018-04-30
Payer: MEDICARE

## 2018-04-30 VITALS
DIASTOLIC BLOOD PRESSURE: 84 MMHG | TEMPERATURE: 98.4 F | HEART RATE: 93 BPM | HEIGHT: 66 IN | SYSTOLIC BLOOD PRESSURE: 142 MMHG | OXYGEN SATURATION: 97 % | WEIGHT: 176.2 LBS | BODY MASS INDEX: 28.32 KG/M2

## 2018-04-30 DIAGNOSIS — M54.50 ACUTE LEFT-SIDED LOW BACK PAIN WITHOUT SCIATICA: Primary | ICD-10-CM

## 2018-04-30 DIAGNOSIS — S16.1XXD CERVICAL STRAIN, SUBSEQUENT ENCOUNTER: ICD-10-CM

## 2018-04-30 DIAGNOSIS — M99.04 SACRAL REGION SOMATIC DYSFUNCTION: ICD-10-CM

## 2018-04-30 DIAGNOSIS — M99.05 PELVIC SOMATIC DYSFUNCTION: ICD-10-CM

## 2018-04-30 DIAGNOSIS — M99.01 CERVICAL SOMATIC DYSFUNCTION: ICD-10-CM

## 2018-04-30 DIAGNOSIS — M54.2 NECK PAIN ON LEFT SIDE: ICD-10-CM

## 2018-04-30 DIAGNOSIS — S39.013D STRAIN OF MUSCLE, FASCIA AND TENDON OF PELVIS, SUBSEQUENT ENCOUNTER: ICD-10-CM

## 2018-04-30 DIAGNOSIS — S39.012D STRAIN, SACRAL, SUBSEQUENT ENCOUNTER: ICD-10-CM

## 2018-04-30 PROCEDURE — 99214 OFFICE O/P EST MOD 30 MIN: CPT | Performed by: FAMILY MEDICINE

## 2018-04-30 PROCEDURE — 98926 OSTEOPATH MANJ 3-4 REGIONS: CPT | Performed by: FAMILY MEDICINE

## 2018-04-30 NOTE — PROGRESS NOTES
Assessment/Plan: patient here today for follow up for back pain and neck pain     No problem-specific Assessment & Plan notes found for this encounter  1  Acute left-sided low back pain without sciatica     2  Pelvic somatic dysfunction     3  Strain of muscle, fascia and tendon of pelvis, subsequent encounter     4  Sacral region somatic dysfunction     5  Strain, sacral, subsequent encounter     6  Neck pain on left side     7  Cervical somatic dysfunction     8  Cervical strain, subsequent encounter          There are no diagnoses linked to this encounter  Subjective:      Patient ID: Khurram Lal is a 75 y o  female  Low back pain all weekend  Midline sacral area and just above  Leaning forward aggravates the pain  Injury was [id number] on last step and fell onto right side  Steps are very difficult  Also some left to midline of upper cervical discomfort  Area not as bad and HA resolved  The following portions of the patient's history were reviewed and updated as appropriate: allergies, current medications, past family history, past medical history, past social history, past surgical history and problem list     Review of Systems      Objective:      /84 (BP Location: Left arm, Patient Position: Sitting, Cuff Size: Standard)   Pulse 93   Temp 98 4 °F (36 9 °C) (Oral)   Ht 5' 5 75" (1 67 m)   Wt 79 9 kg (176 lb 3 2 oz)   LMP  (LMP Unknown)   SpO2 97%   BMI 28 66 kg/m²          Physical Exam   Musculoskeletal:   Stand: Pelvis level  Sacrum rotated right  Prone: Inferior left PSIS, Sacrum: Side bent left  Right anterior pelvis shifted superior

## 2018-04-30 NOTE — PATIENT INSTRUCTIONS
Muscle energy to sacrum  Unfold pelvis  Stretched cervical inferior direction  Recheck low back and cervical improved

## 2018-05-01 ENCOUNTER — OFFICE VISIT (OUTPATIENT)
Dept: FAMILY MEDICINE CLINIC | Facility: CLINIC | Age: 68
End: 2018-05-01
Payer: MEDICARE

## 2018-05-01 VITALS
SYSTOLIC BLOOD PRESSURE: 146 MMHG | BODY MASS INDEX: 27.97 KG/M2 | DIASTOLIC BLOOD PRESSURE: 78 MMHG | OXYGEN SATURATION: 98 % | TEMPERATURE: 98.6 F | HEART RATE: 97 BPM | WEIGHT: 174 LBS | HEIGHT: 66 IN

## 2018-05-01 DIAGNOSIS — M99.05 PELVIC SOMATIC DYSFUNCTION: ICD-10-CM

## 2018-05-01 DIAGNOSIS — G89.29 CHRONIC RIGHT-SIDED LOW BACK PAIN WITHOUT SCIATICA: Primary | ICD-10-CM

## 2018-05-01 DIAGNOSIS — S39.013D STRAIN OF MUSCLE, FASCIA AND TENDON OF PELVIS, SUBSEQUENT ENCOUNTER: ICD-10-CM

## 2018-05-01 DIAGNOSIS — M54.50 CHRONIC RIGHT-SIDED LOW BACK PAIN WITHOUT SCIATICA: Primary | ICD-10-CM

## 2018-05-01 DIAGNOSIS — S39.012D STRAIN, SACRAL, SUBSEQUENT ENCOUNTER: ICD-10-CM

## 2018-05-01 DIAGNOSIS — M99.03 SEGMENTAL AND SOMATIC DYSFUNCTION OF LUMBAR REGION: ICD-10-CM

## 2018-05-01 DIAGNOSIS — S39.012A LUMBAR STRAIN, INITIAL ENCOUNTER: ICD-10-CM

## 2018-05-01 DIAGNOSIS — M99.04 SACRAL REGION SOMATIC DYSFUNCTION: ICD-10-CM

## 2018-05-01 PROCEDURE — 99215 OFFICE O/P EST HI 40 MIN: CPT | Performed by: FAMILY MEDICINE

## 2018-05-01 PROCEDURE — 98926 OSTEOPATH MANJ 3-4 REGIONS: CPT | Performed by: FAMILY MEDICINE

## 2018-05-01 NOTE — PATIENT INSTRUCTIONS
Unfold sacrum  Cupping to superior sacrum times 2  Recheck, pain comes and goes  ME to lumbar and pelvis  Stretched LSI soft tissue BL  45 minutes spent with patient

## 2018-05-01 NOTE — PROGRESS NOTES
Assessment/Plan:patient here today for back pain   patient would like to have a prescription for diclofenac sodium topical gel     No problem-specific Assessment & Plan notes found for this encounter  1  Chronic right-sided low back pain without sciatica  diclofenac sodium (VOLTAREN) 1 %   2  Sacral region somatic dysfunction     3  Strain, sacral, subsequent encounter     4  Pelvic somatic dysfunction     5  Strain of muscle, fascia and tendon of pelvis, subsequent encounter     6  Lumbar strain, initial encounter     7  Segmental and somatic dysfunction of lumbar region            There are no diagnoses linked to this encounter  Subjective:      Patient ID: Ibeth Sarabia is a 75 y o  female  Follow up  RLB pain  Neck and HA's are better,        The following portions of the patient's history were reviewed and updated as appropriate: allergies, current medications, past family history, past medical history, past social history, past surgical history and problem list     Review of Systems   Musculoskeletal: Positive for back pain  Objective:      /78 (BP Location: Left arm, Patient Position: Sitting, Cuff Size: Standard)   Pulse 97   Temp 98 6 °F (37 °C) (Oral)   Ht 5' 5 75" (1 67 m)   Wt 78 9 kg (174 lb)   LMP  (LMP Unknown)   SpO2 98%   BMI 28 30 kg/m²          Physical Exam   Constitutional: She appears well-developed and well-nourished  Cardiovascular: Normal rate  Pulmonary/Chest: Effort normal    Musculoskeletal:   Tender right superior sacrum, pelvis SB left, Sacrum, R on R torsion, lumbar L5 rotated right  Skin: Skin is warm and dry     Psychiatric: Her behavior is normal  Judgment and thought content normal

## 2019-03-27 ENCOUNTER — OFFICE VISIT (OUTPATIENT)
Dept: FAMILY MEDICINE CLINIC | Facility: CLINIC | Age: 69
End: 2019-03-27
Payer: MEDICARE

## 2019-03-27 VITALS
TEMPERATURE: 98.8 F | SYSTOLIC BLOOD PRESSURE: 132 MMHG | HEART RATE: 90 BPM | BODY MASS INDEX: 28.3 KG/M2 | DIASTOLIC BLOOD PRESSURE: 74 MMHG | OXYGEN SATURATION: 98 % | HEIGHT: 66 IN

## 2019-03-27 DIAGNOSIS — M99.03 SEGMENTAL AND SOMATIC DYSFUNCTION OF LUMBAR REGION: ICD-10-CM

## 2019-03-27 DIAGNOSIS — S16.1XXA CERVICAL STRAIN, INITIAL ENCOUNTER: ICD-10-CM

## 2019-03-27 DIAGNOSIS — M99.01 CERVICAL SOMATIC DYSFUNCTION: ICD-10-CM

## 2019-03-27 DIAGNOSIS — M99.08 SEGMENTAL AND SOMATIC DYSFUNCTION OF RIB CAGE: ICD-10-CM

## 2019-03-27 DIAGNOSIS — M54.2 NECK PAIN: ICD-10-CM

## 2019-03-27 DIAGNOSIS — S39.012A LUMBAR STRAIN, INITIAL ENCOUNTER: ICD-10-CM

## 2019-03-27 DIAGNOSIS — M99.05 PELVIC SOMATIC DYSFUNCTION: ICD-10-CM

## 2019-03-27 DIAGNOSIS — S39.013A STRAIN OF MUSCLE, FASCIA AND TENDON OF PELVIS, INITIAL ENCOUNTER: ICD-10-CM

## 2019-03-27 DIAGNOSIS — M25.551 RIGHT HIP PAIN: Primary | ICD-10-CM

## 2019-03-27 DIAGNOSIS — S23.41XA RIB SPRAIN, INITIAL ENCOUNTER: ICD-10-CM

## 2019-03-27 DIAGNOSIS — M99.04 SACRAL REGION SOMATIC DYSFUNCTION: ICD-10-CM

## 2019-03-27 DIAGNOSIS — S39.012A STRAIN, SACRAL, INITIAL ENCOUNTER: ICD-10-CM

## 2019-03-27 DIAGNOSIS — M54.50 ACUTE RIGHT-SIDED LOW BACK PAIN WITHOUT SCIATICA: ICD-10-CM

## 2019-03-27 PROCEDURE — 99214 OFFICE O/P EST MOD 30 MIN: CPT | Performed by: FAMILY MEDICINE

## 2019-03-27 PROCEDURE — 98927 OSTEOPATH MANJ 5-6 REGIONS: CPT | Performed by: FAMILY MEDICINE

## 2019-03-27 NOTE — PROGRESS NOTES
Assessment/Plan: pt here today for right hip pain     No problem-specific Assessment & Plan notes found for this encounter  Diagnoses and all orders for this visit:    Right hip pain    Neck pain    Acute right-sided low back pain without sciatica    Cervical somatic dysfunction  -     OMT    Cervical strain, initial encounter  -     OMT    Rib sprain, initial encounter  -     OMT    Segmental and somatic dysfunction of rib cage  -     OMT    Pelvic somatic dysfunction  -     OMT    Strain of muscle, fascia and tendon of pelvis, initial encounter  -     OMT    Sacral region somatic dysfunction  -     OMT    Strain, sacral, initial encounter  -     OMT    Lumbar strain, initial encounter  -     OMT    Segmental and somatic dysfunction of lumbar region  -     OMT          Subjective:      Patient ID: Gertrudis Bazzi is a 70 y o  female  Pain right groin and right innominate, low back and right hip past 4 months  Also BL CT junction discomfort  Has been exercising and taking [employer] dancing lessons        The following portions of the patient's history were reviewed and updated as appropriate: allergies, current medications, past family history, past medical history, past social history, past surgical history and problem list     Current Outpatient Medications:     aspirin 81 mg chewable tablet, Chew 81 mg, Disp: , Rfl:     Cholecalciferol 2000 units TABS, Take 2,000 Units by mouth, Disp: , Rfl:     diclofenac sodium (VOLTAREN) 1 %, Apply 2 g topically 4 (four) times a day, Disp: 100 g, Rfl: 0    ezetimibe (ZETIA) 10 mg tablet, Take 10 mg by mouth, Disp: , Rfl:     Levothyroxine Sodium 175 MCG CAPS, Take 0 15 mg by mouth, Disp: , Rfl:     losartan (COZAAR) 50 mg tablet, Take 100 mg by mouth daily , Disp: , Rfl:     omeprazole (PriLOSEC) 40 MG capsule, Take 40 mg by mouth, Disp: , Rfl:     rosuvastatin (CRESTOR) 40 MG tablet, Take 1 tablet by mouth daily, Disp: , Rfl:     sertraline (ZOLOFT) 100 mg tablet, 250 mg  , Disp: , Rfl:      Allergies   Allergen Reactions    Latex     Trimethoprim      Action Taken: hives; Review of Systems   Constitutional: Negative  Respiratory: Negative  Cardiovascular: Negative  Gastrointestinal: Negative  Genitourinary: Negative  Musculoskeletal: Positive for back pain, neck pain and neck stiffness  Skin: Negative  Neurological: Negative  Psychiatric/Behavioral: Negative  Objective:      /74 (BP Location: Left arm, Patient Position: Sitting, Cuff Size: Standard)   Pulse 90   Temp 98 8 °F (37 1 °C) (Oral)   Ht 5' 5 75" (1 67 m)   LMP  (LMP Unknown)   SpO2 98%   BMI 28 30 kg/m²          Physical Exam   Constitutional: She is oriented to person, place, and time  She appears well-developed and well-nourished  HENT:   Head: Normocephalic and atraumatic  Eyes: EOM are normal    Neck: Neck supple  Cardiovascular: Normal rate  Pulmonary/Chest: Effort normal    Musculoskeletal:   Stand: Right anterior innominate presentation  Prone: Superior right PSIS  Sacrum: L on R torsion  Low lumbar rotated left, tender on right  Supine: Inferior right bpue and tender  Sit: Rib #1 left posterior, Multiple foldings and tight tissue BL cervical area  Neurological: She is alert and oriented to person, place, and time  Skin: Skin is warm and dry  Psychiatric: She has a normal mood and affect   Her behavior is normal  Judgment and thought content normal

## 2019-03-29 NOTE — PROGRESS NOTES
OMT  Performed by: Paul White DO  Authorized by: Paul White DO     Verbal consent obtained?: Yes    Risks and benefits: Risks, benefits and alternatives were discussed    Consent given by:  Patient  Patient states understanding of procedure being performed: Yes    Patient's understanding of procedure matches consent: Yes    Procedure Details:     Region evaluated and treated:  Cervical, Ribs, Lumbar, Sacrum/Pelvis and Pelvis Innominate    Cervical Details:     Examination Method:  Tissue Texture Change, Stability, Laxity, Effusions, Tone, Asymmetry, Misalignment, Crepitation, Defects, Masses and Tenderness, Pain    Severity:  Mild    Treatment Method:  Muscle Energy Treatment, Soft Tissue Treatment and Myofascial Release Treatment    Response:  Improved - The somatic dysfunction is improved but not completely resolved  Lumbar details:     Examination Method:  Asymmetry, Misalignment, Crepitation, Defects, Masses and Tenderness, Pain    Severity:  Mild    Treatment Method:  Muscle Energy Treatment and Soft Tissue Treatment    Response:  Resolved - The somatic dysfunction is completely resolved without evidence of it ever having been present  Sacrum/Pelvis details:     Examination Method:  Asymmetry, Misalignment, Crepitation, Defects, Masses and Tenderness, Pain    Severity:  Mild    Treatment Method:  High Velocity, Low Amplitude Treatment and Soft Tissue Treatment    Response:  Resolved - The somatic dysfunction is completely resolved without evidence of it ever having been present  Pelvis Innominate details:     Examination Method:  Asymmetry, Misalignment, Crepitation, Defects, Masses and Tenderness, Pain    Treatment Method:  Muscle Energy Treatment and Soft Tissue Treatment    Response:  Resolved - The somatic dysfunction is completely resolved without evidence of it ever having been present      Ribs details:     Examination Method:  Asymmetry, Misalignment, Crepitation, Defects, Masses and Tenderness, Pain    Severity:  Mild    Treatment Method:  High Velocity, Low Amplitude Treatment and Soft Tissue Treatment    Response:  Resolved - The somatic dysfunction is completely resolved without evidence of it ever having been present      Total Regions Treated:  5

## 2019-04-07 ENCOUNTER — TELEPHONE (OUTPATIENT)
Dept: OTHER | Facility: OTHER | Age: 69
End: 2019-04-07

## 2019-05-07 ENCOUNTER — OFFICE VISIT (OUTPATIENT)
Dept: FAMILY MEDICINE CLINIC | Facility: CLINIC | Age: 69
End: 2019-05-07
Payer: MEDICARE

## 2019-05-07 VITALS
HEIGHT: 66 IN | SYSTOLIC BLOOD PRESSURE: 128 MMHG | TEMPERATURE: 98.5 F | DIASTOLIC BLOOD PRESSURE: 76 MMHG | OXYGEN SATURATION: 98 % | BODY MASS INDEX: 28.3 KG/M2

## 2019-05-07 DIAGNOSIS — M99.04 SACRAL REGION SOMATIC DYSFUNCTION: ICD-10-CM

## 2019-05-07 DIAGNOSIS — M99.01 CERVICAL SOMATIC DYSFUNCTION: ICD-10-CM

## 2019-05-07 DIAGNOSIS — M99.05 PELVIC SOMATIC DYSFUNCTION: ICD-10-CM

## 2019-05-07 DIAGNOSIS — M99.02 SOMATIC DYSFUNCTION OF THORACIC REGION: ICD-10-CM

## 2019-05-07 DIAGNOSIS — M54.2 NECK PAIN, BILATERAL POSTERIOR: ICD-10-CM

## 2019-05-07 DIAGNOSIS — S39.012A LUMBAR STRAIN, INITIAL ENCOUNTER: ICD-10-CM

## 2019-05-07 DIAGNOSIS — S16.1XXA CERVICAL STRAIN, INITIAL ENCOUNTER: ICD-10-CM

## 2019-05-07 DIAGNOSIS — M25.561 ACUTE PAIN OF RIGHT KNEE: ICD-10-CM

## 2019-05-07 DIAGNOSIS — S29.012A STRAIN OF MUSCLE AND TENDON OF BACK WALL OF THORAX, INITIAL ENCOUNTER: ICD-10-CM

## 2019-05-07 DIAGNOSIS — M54.6 ACUTE LEFT-SIDED THORACIC BACK PAIN: ICD-10-CM

## 2019-05-07 DIAGNOSIS — S39.013A STRAIN OF MUSCLE, FASCIA AND TENDON OF PELVIS, INITIAL ENCOUNTER: ICD-10-CM

## 2019-05-07 DIAGNOSIS — M54.50 ACUTE RIGHT-SIDED LOW BACK PAIN WITHOUT SCIATICA: Primary | ICD-10-CM

## 2019-05-07 DIAGNOSIS — S39.012A STRAIN, SACRAL, INITIAL ENCOUNTER: ICD-10-CM

## 2019-05-07 DIAGNOSIS — S23.41XA RIB SPRAIN, INITIAL ENCOUNTER: ICD-10-CM

## 2019-05-07 DIAGNOSIS — M99.03 SEGMENTAL AND SOMATIC DYSFUNCTION OF LUMBAR REGION: ICD-10-CM

## 2019-05-07 DIAGNOSIS — M99.08 SEGMENTAL AND SOMATIC DYSFUNCTION OF RIB CAGE: ICD-10-CM

## 2019-05-07 PROCEDURE — 98928 OSTEOPATH MANJ 7-8 REGIONS: CPT | Performed by: FAMILY MEDICINE

## 2019-05-07 PROCEDURE — 99215 OFFICE O/P EST HI 40 MIN: CPT | Performed by: FAMILY MEDICINE

## 2019-05-15 ENCOUNTER — OFFICE VISIT (OUTPATIENT)
Dept: FAMILY MEDICINE CLINIC | Facility: CLINIC | Age: 69
End: 2019-05-15
Payer: MEDICARE

## 2019-05-15 VITALS
TEMPERATURE: 97.9 F | HEIGHT: 68 IN | HEART RATE: 81 BPM | DIASTOLIC BLOOD PRESSURE: 86 MMHG | OXYGEN SATURATION: 99 % | SYSTOLIC BLOOD PRESSURE: 134 MMHG | BODY MASS INDEX: 26.65 KG/M2

## 2019-05-15 DIAGNOSIS — M54.9 UPPER BACK PAIN ON RIGHT SIDE: ICD-10-CM

## 2019-05-15 DIAGNOSIS — M99.02 SOMATIC DYSFUNCTION OF THORACIC REGION: ICD-10-CM

## 2019-05-15 DIAGNOSIS — S16.1XXA CERVICAL STRAIN, INITIAL ENCOUNTER: ICD-10-CM

## 2019-05-15 DIAGNOSIS — M99.01 CERVICAL SOMATIC DYSFUNCTION: ICD-10-CM

## 2019-05-15 DIAGNOSIS — S23.41XA RIB SPRAIN, INITIAL ENCOUNTER: ICD-10-CM

## 2019-05-15 DIAGNOSIS — M54.2 NECK PAIN ON RIGHT SIDE: Primary | ICD-10-CM

## 2019-05-15 DIAGNOSIS — S29.012A STRAIN OF MUSCLE AND TENDON OF BACK WALL OF THORAX, INITIAL ENCOUNTER: ICD-10-CM

## 2019-05-15 DIAGNOSIS — M99.08 SEGMENTAL AND SOMATIC DYSFUNCTION OF RIB CAGE: ICD-10-CM

## 2019-05-15 PROCEDURE — 98926 OSTEOPATH MANJ 3-4 REGIONS: CPT | Performed by: FAMILY MEDICINE

## 2019-06-17 ENCOUNTER — OFFICE VISIT (OUTPATIENT)
Dept: FAMILY MEDICINE CLINIC | Facility: CLINIC | Age: 69
End: 2019-06-17
Payer: MEDICARE

## 2019-06-17 VITALS
SYSTOLIC BLOOD PRESSURE: 144 MMHG | HEART RATE: 74 BPM | BODY MASS INDEX: 26.65 KG/M2 | OXYGEN SATURATION: 98 % | TEMPERATURE: 98.1 F | HEIGHT: 68 IN | DIASTOLIC BLOOD PRESSURE: 88 MMHG

## 2019-06-17 DIAGNOSIS — M99.02 SOMATIC DYSFUNCTION OF THORACIC REGION: ICD-10-CM

## 2019-06-17 DIAGNOSIS — R07.81 RIB PAIN ON RIGHT SIDE: ICD-10-CM

## 2019-06-17 DIAGNOSIS — M99.01 CERVICAL SOMATIC DYSFUNCTION: ICD-10-CM

## 2019-06-17 DIAGNOSIS — M99.08 SEGMENTAL AND SOMATIC DYSFUNCTION OF RIB CAGE: ICD-10-CM

## 2019-06-17 DIAGNOSIS — S29.012A STRAIN OF MUSCLE AND TENDON OF BACK WALL OF THORAX, INITIAL ENCOUNTER: ICD-10-CM

## 2019-06-17 DIAGNOSIS — S16.1XXA CERVICAL STRAIN, INITIAL ENCOUNTER: ICD-10-CM

## 2019-06-17 DIAGNOSIS — S23.41XA RIB SPRAIN, INITIAL ENCOUNTER: ICD-10-CM

## 2019-06-17 DIAGNOSIS — M54.2 NECK PAIN ON RIGHT SIDE: Primary | ICD-10-CM

## 2019-06-17 PROCEDURE — 99214 OFFICE O/P EST MOD 30 MIN: CPT | Performed by: FAMILY MEDICINE

## 2019-06-17 PROCEDURE — 98926 OSTEOPATH MANJ 3-4 REGIONS: CPT | Performed by: FAMILY MEDICINE

## 2019-06-18 ENCOUNTER — OFFICE VISIT (OUTPATIENT)
Dept: FAMILY MEDICINE CLINIC | Facility: CLINIC | Age: 69
End: 2019-06-18
Payer: MEDICARE

## 2019-06-18 VITALS
SYSTOLIC BLOOD PRESSURE: 136 MMHG | DIASTOLIC BLOOD PRESSURE: 74 MMHG | TEMPERATURE: 97.8 F | HEART RATE: 77 BPM | HEIGHT: 68 IN | OXYGEN SATURATION: 98 % | BODY MASS INDEX: 26.65 KG/M2

## 2019-06-18 DIAGNOSIS — M99.04 SACRAL REGION SOMATIC DYSFUNCTION: ICD-10-CM

## 2019-06-18 DIAGNOSIS — S39.013A STRAIN OF MUSCLE, FASCIA AND TENDON OF PELVIS, INITIAL ENCOUNTER: ICD-10-CM

## 2019-06-18 DIAGNOSIS — M89.8X1 PAIN OF RIGHT CLAVICLE: ICD-10-CM

## 2019-06-18 DIAGNOSIS — M99.07 UPPER EXTREMITY SOMATIC DYSFUNCTION: ICD-10-CM

## 2019-06-18 DIAGNOSIS — M99.05 PELVIC SOMATIC DYSFUNCTION: ICD-10-CM

## 2019-06-18 DIAGNOSIS — S46.919A MUSCLE STRAIN OF UPPER EXTREMITY, INITIAL ENCOUNTER: ICD-10-CM

## 2019-06-18 DIAGNOSIS — M54.50 ACUTE RIGHT-SIDED LOW BACK PAIN WITHOUT SCIATICA: Primary | ICD-10-CM

## 2019-06-18 DIAGNOSIS — S39.012A STRAIN, SACRAL, INITIAL ENCOUNTER: ICD-10-CM

## 2019-06-18 PROCEDURE — 98926 OSTEOPATH MANJ 3-4 REGIONS: CPT | Performed by: FAMILY MEDICINE

## 2019-07-09 ENCOUNTER — OFFICE VISIT (OUTPATIENT)
Dept: FAMILY MEDICINE CLINIC | Facility: CLINIC | Age: 69
End: 2019-07-09
Payer: MEDICARE

## 2019-07-09 VITALS
TEMPERATURE: 97.7 F | HEIGHT: 68 IN | BODY MASS INDEX: 26.65 KG/M2 | SYSTOLIC BLOOD PRESSURE: 138 MMHG | RESPIRATION RATE: 16 BRPM | OXYGEN SATURATION: 98 % | HEART RATE: 71 BPM | DIASTOLIC BLOOD PRESSURE: 92 MMHG

## 2019-07-09 DIAGNOSIS — M99.08 SEGMENTAL AND SOMATIC DYSFUNCTION OF RIB CAGE: ICD-10-CM

## 2019-07-09 DIAGNOSIS — M54.50 ACUTE MIDLINE LOW BACK PAIN WITHOUT SCIATICA: ICD-10-CM

## 2019-07-09 DIAGNOSIS — M99.02 SOMATIC DYSFUNCTION OF THORACIC REGION: ICD-10-CM

## 2019-07-09 DIAGNOSIS — S23.41XA RIB SPRAIN, INITIAL ENCOUNTER: ICD-10-CM

## 2019-07-09 DIAGNOSIS — S16.1XXA CERVICAL STRAIN, INITIAL ENCOUNTER: ICD-10-CM

## 2019-07-09 DIAGNOSIS — M54.2 NECK PAIN, BILATERAL: Primary | ICD-10-CM

## 2019-07-09 DIAGNOSIS — M99.05 PELVIC SOMATIC DYSFUNCTION: ICD-10-CM

## 2019-07-09 DIAGNOSIS — M99.04 SACRAL REGION SOMATIC DYSFUNCTION: ICD-10-CM

## 2019-07-09 DIAGNOSIS — M54.9 UPPER BACK PAIN ON RIGHT SIDE: ICD-10-CM

## 2019-07-09 DIAGNOSIS — M99.01 CERVICAL SOMATIC DYSFUNCTION: ICD-10-CM

## 2019-07-09 DIAGNOSIS — S29.012A STRAIN OF MUSCLE AND TENDON OF BACK WALL OF THORAX, INITIAL ENCOUNTER: ICD-10-CM

## 2019-07-09 DIAGNOSIS — S39.013A STRAIN OF MUSCLE, FASCIA AND TENDON OF PELVIS, INITIAL ENCOUNTER: ICD-10-CM

## 2019-07-09 DIAGNOSIS — S39.012A STRAIN, SACRAL, INITIAL ENCOUNTER: ICD-10-CM

## 2019-07-09 PROCEDURE — 99214 OFFICE O/P EST MOD 30 MIN: CPT | Performed by: FAMILY MEDICINE

## 2019-07-09 PROCEDURE — 98927 OSTEOPATH MANJ 5-6 REGIONS: CPT | Performed by: FAMILY MEDICINE

## 2019-07-09 NOTE — PROGRESS NOTES
Chief Complaint   Patient presents with    Neck Pain     x 4 days    Headache     Assessment/Plan:         Diagnoses and all orders for this visit:    Neck pain, bilateral    Upper back pain on right side    Acute midline low back pain without sciatica    Cervical somatic dysfunction  -     OMT    Cervical strain, initial encounter  -     OMT    Rib sprain, initial encounter  -     OMT    Segmental and somatic dysfunction of rib cage  -     OMT    Somatic dysfunction of thoracic region  -     OMT    Strain of muscle and tendon of back wall of thorax, initial encounter  -     OMT    Sacral region somatic dysfunction  -     OMT    Strain, sacral, initial encounter  -     OMT    Pelvic somatic dysfunction  -     OMT    Strain of muscle, fascia and tendon of pelvis, initial encounter  -     OMT          Subjective:      Patient ID: Tyrese Thrasher is a 70 y o  female  Fright neck, CT junction pain since Saturday, 3 days ago  Slept in awkward position with head SB to right  Also low back  The following portions of the patient's history were reviewed and updated as appropriate: allergies, current medications, past family history, past medical history, past social history and past surgical history      Current Outpatient Medications:     aspirin 81 mg chewable tablet, Chew 81 mg, Disp: , Rfl:     Cholecalciferol 2000 units TABS, Take 2,000 Units by mouth, Disp: , Rfl:     diclofenac sodium (VOLTAREN) 1 %, Apply 2 g topically 4 (four) times a day, Disp: 100 g, Rfl: 0    ezetimibe (ZETIA) 10 mg tablet, Take 10 mg by mouth, Disp: , Rfl:     levothyroxine 150 mcg tablet, Take 0 15 mg by mouth , Disp: , Rfl:     losartan (COZAAR) 50 mg tablet, Take 100 mg by mouth daily , Disp: , Rfl:     omeprazole (PriLOSEC) 40 MG capsule, Take 40 mg by mouth, Disp: , Rfl:     rosuvastatin (CRESTOR) 40 MG tablet, Take 1 tablet by mouth daily, Disp: , Rfl:     sertraline (ZOLOFT) 100 mg tablet, 250 mg  , Disp: , Rfl: Allergies   Allergen Reactions    Latex     Trimethoprim      Action Taken: hives; Review of Systems   Constitutional: Negative  HENT: Negative  Eyes: Negative  Respiratory: Negative  Cardiovascular: Negative  Musculoskeletal: Positive for back pain, neck pain and neck stiffness  Neck pain BL, decreased SB to right  Upper back pain on right side, rib pain right side, low back discomfort midline  Skin: Negative  Neurological: Negative  Psychiatric/Behavioral: Negative  Objective:      /92 (BP Location: Left arm, Patient Position: Sitting, Cuff Size: Adult)   Pulse 71   Temp 97 7 °F (36 5 °C) (Oral)   Resp 16   Ht 5' 7 75" (1 721 m)   LMP  (LMP Unknown)   SpO2 98%   BMI 26 65 kg/m²          Physical Exam   Constitutional: She is oriented to person, place, and time  She appears well-developed and well-nourished  HENT:   Head: Normocephalic and atraumatic  Cardiovascular: Normal rate  Pulmonary/Chest: Effort normal    Musculoskeletal:   Stand: L AI presentation  Sit: Rib #1 right anterior and slight inferior  Few cervical foldings  T1 SB and rotated right  Prone: Superior right PSIS, sacrum: L on R torsion  Neurological: She is alert and oriented to person, place, and time  Skin: Skin is warm and dry  Psychiatric: She has a normal mood and affect   Her behavior is normal  Judgment and thought content normal

## 2019-07-09 NOTE — PROGRESS NOTES
OMT  Performed by: Evan Nobles DO  Authorized by: Evan Nobles DO     Verbal consent obtained?: Yes    Risks and benefits: Risks, benefits and alternatives were discussed    Consent given by:  Patient  Procedure Details:     Region evaluated and treated:  Cervical, Thoracic T1 - T4, Ribs, Sacrum/Pelvis and Pelvis Innominate    Cervical Details:     Examination Method:  Asymmetry, Misalignment, Crepitation, Defects, Masses and Tenderness, Pain    Severity:  Mild    Treatment Method:  Soft Tissue Treatment and Muscle Energy Treatment    Response:  Improved - The somatic dysfunction is improved but not completely resolved  Thoracic T1 - T4 details:     Examination Method:  Asymmetry, Misalignment, Crepitation, Defects, Masses    Severity:  Mild    Treatment Method:  Muscle Energy Treatment and Soft Tissue Treatment    Response:  Resolved    Sacrum/Pelvis details:     Examination Method:  Asymmetry, Misalignment, Crepitation, Defects, Masses and Tenderness, Pain    Severity:  Mild    Treatment Method:  High Velocity, Low Amplitude Treatment and Soft Tissue Treatment    Response:  Resolved - The somatic dysfunction is completely resolved without evidence of it ever having been present  Pelvis Innominate details:     Examination Method:  Asymmetry, Misalignment, Crepitation, Defects, Masses    Severity:  Mild    Treatment Method:  Muscle Energy Treatment and Soft Tissue Treatment    Response:  Resolved - The somatic dysfunction is completely resolved without evidence of it ever having been present  Ribs details:     Examination Method:  Asymmetry, Misalignment, Crepitation, Defects, Masses and Tenderness, Pain    Severity:  Mild    Treatment Method:  High Velocity, Low Amplitude Treatment    Response:  Resolved - The somatic dysfunction is completely resolved without evidence of it ever having been present      Total Regions Treated:  5

## 2019-08-12 ENCOUNTER — OFFICE VISIT (OUTPATIENT)
Dept: FAMILY MEDICINE CLINIC | Facility: CLINIC | Age: 69
End: 2019-08-12
Payer: MEDICARE

## 2019-08-12 VITALS
HEIGHT: 68 IN | TEMPERATURE: 98.4 F | DIASTOLIC BLOOD PRESSURE: 82 MMHG | SYSTOLIC BLOOD PRESSURE: 148 MMHG | OXYGEN SATURATION: 98 % | HEART RATE: 79 BPM | BODY MASS INDEX: 26.65 KG/M2

## 2019-08-12 DIAGNOSIS — S39.012A LUMBAR STRAIN, INITIAL ENCOUNTER: ICD-10-CM

## 2019-08-12 DIAGNOSIS — S23.41XA RIB SPRAIN, INITIAL ENCOUNTER: ICD-10-CM

## 2019-08-12 DIAGNOSIS — M54.6 ACUTE BILATERAL THORACIC BACK PAIN: ICD-10-CM

## 2019-08-12 DIAGNOSIS — M54.2 NECK PAIN ON RIGHT SIDE: Primary | ICD-10-CM

## 2019-08-12 DIAGNOSIS — M54.50 ACUTE BILATERAL LOW BACK PAIN WITHOUT SCIATICA: ICD-10-CM

## 2019-08-12 DIAGNOSIS — M99.03 SEGMENTAL AND SOMATIC DYSFUNCTION OF LUMBAR REGION: ICD-10-CM

## 2019-08-12 DIAGNOSIS — M99.05 PELVIC SOMATIC DYSFUNCTION: ICD-10-CM

## 2019-08-12 DIAGNOSIS — M99.04 SACRAL REGION SOMATIC DYSFUNCTION: ICD-10-CM

## 2019-08-12 DIAGNOSIS — S39.013A STRAIN OF MUSCLE, FASCIA AND TENDON OF PELVIS, INITIAL ENCOUNTER: ICD-10-CM

## 2019-08-12 DIAGNOSIS — S16.1XXA CERVICAL STRAIN, INITIAL ENCOUNTER: ICD-10-CM

## 2019-08-12 DIAGNOSIS — M99.01 CERVICAL SOMATIC DYSFUNCTION: ICD-10-CM

## 2019-08-12 DIAGNOSIS — M99.02 SOMATIC DYSFUNCTION OF THORACIC REGION: ICD-10-CM

## 2019-08-12 DIAGNOSIS — S39.012A STRAIN, SACRAL, INITIAL ENCOUNTER: ICD-10-CM

## 2019-08-12 DIAGNOSIS — S29.012A STRAIN OF MUSCLE AND TENDON OF BACK WALL OF THORAX, INITIAL ENCOUNTER: ICD-10-CM

## 2019-08-12 DIAGNOSIS — M99.08 SEGMENTAL AND SOMATIC DYSFUNCTION OF RIB CAGE: ICD-10-CM

## 2019-08-12 PROCEDURE — 98928 OSTEOPATH MANJ 7-8 REGIONS: CPT | Performed by: FAMILY MEDICINE

## 2019-08-12 PROCEDURE — 99214 OFFICE O/P EST MOD 30 MIN: CPT | Performed by: FAMILY MEDICINE

## 2019-08-12 NOTE — PROGRESS NOTES
Chief Complaint   Patient presents with    Fall    Knee Pain    Shoulder Pain    Neck Pain     Assessment/Plan:  No heat to area  Discussed physical activity next few days  No dancing this week  Diagnoses and all orders for this visit:    Neck pain on right side    Acute bilateral thoracic back pain    Acute bilateral low back pain without sciatica    Cervical somatic dysfunction  -     OMT    Cervical strain, initial encounter  -     OMT    Somatic dysfunction of thoracic region  -     OMT    Strain of muscle and tendon of back wall of thorax, initial encounter  -     OMT    Rib sprain, initial encounter  -     OMT    Segmental and somatic dysfunction of rib cage  -     OMT    Lumbar strain, initial encounter  -     OMT    Segmental and somatic dysfunction of lumbar region  -     OMT    Pelvic somatic dysfunction  -     OMT    Strain of muscle, fascia and tendon of pelvis, initial encounter  -     OMT    Sacral region somatic dysfunction  -     OMT    Strain, sacral, initial encounter  -     OMT          Subjective:      Patient ID: Khurram Lal is a 70 y o  female  Fall 2 5 weeks ago  Getting home renovated and foot got caught resulting in falling on both knees and right elbow  Low, mid and upper back pain  Some neck pain  Abduction of shoulder hurts BL superior trap, R > L  The following portions of the patient's history were reviewed and updated as appropriate: allergies, current medications, past family history, past medical history, past social history and past surgical history  Review of Systems   Constitutional: Negative  HENT: Negative  Eyes: Negative  Respiratory: Negative  Cardiovascular: Negative  Gastrointestinal: Negative  Genitourinary: Negative  Musculoskeletal: Positive for back pain, neck pain and neck stiffness  Skin: Negative  Neurological: Negative  Psychiatric/Behavioral: Negative            Objective:      /82 (BP Location: Left arm, Patient Position: Sitting, Cuff Size: Standard)   Pulse 79   Temp 98 4 °F (36 9 °C) (Oral)   Ht 5' 7 75" (1 721 m)   LMP  (LMP Unknown)   SpO2 98%   BMI 26 65 kg/m²     Current Outpatient Medications:     aspirin 81 mg chewable tablet, Chew 81 mg, Disp: , Rfl:     Cholecalciferol 2000 units TABS, Take 2,000 Units by mouth, Disp: , Rfl:     diclofenac sodium (VOLTAREN) 1 %, Apply 2 g topically 4 (four) times a day, Disp: 100 g, Rfl: 0    ezetimibe (ZETIA) 10 mg tablet, Take 10 mg by mouth, Disp: , Rfl:     levothyroxine 150 mcg tablet, Take 0 15 mg by mouth , Disp: , Rfl:     losartan (COZAAR) 50 mg tablet, Take 100 mg by mouth daily , Disp: , Rfl:     omeprazole (PriLOSEC) 40 MG capsule, Take 40 mg by mouth, Disp: , Rfl:     rosuvastatin (CRESTOR) 40 MG tablet, Take 1 tablet by mouth daily, Disp: , Rfl:     sertraline (ZOLOFT) 100 mg tablet, 250 mg  , Disp: , Rfl:      Allergies   Allergen Reactions    Latex     Trimethoprim      Action Taken: hives; Physical Exam   Constitutional: She is oriented to person, place, and time  She appears well-developed and well-nourished  HENT:   Head: Normocephalic and atraumatic  Cardiovascular: Normal rate, regular rhythm and normal heart sounds  Pulmonary/Chest: Effort normal and breath sounds normal    Abdominal: Soft  Musculoskeletal:   Stand: Superior left innominate  Sit: Rib #1 left posterior and tender  T1 rotated right and tender on the left  Prone: Superior Right PSIS, Sacrum: L on R torsion  Low lumbar rotated left  Multiple TV and rib foldings and areas of tender  Neurological: She is alert and oriented to person, place, and time  Skin: Skin is warm and dry  Psychiatric: She has a normal mood and affect   Her behavior is normal  Judgment and thought content normal

## 2019-08-13 NOTE — PROGRESS NOTES
OMT  Performed by: Kathrin Parker DO  Authorized by: Kathrin Parker, DO     Verbal consent obtained?: Yes    Risks and benefits: Risks, benefits and alternatives were discussed    Consent given by:  Patient  Procedure Details:     Region evaluated and treated:  Cervical, Thoracic T1 - T4, Lumbar, Sacrum/Pelvis, Pelvis Innominate, Thoracic T5 - T9 and Ribs    Cervical Details:     Examination Method:  Asymmetry, Misalignment, Crepitation, Defects, Masses, Tenderness, Pain and Range of Motion, Contracture    Severity:  Mild    Treatment Method:  Muscle Energy Treatment, Myofascial Release Treatment and Soft Tissue Treatment    Response:  Improved - The somatic dysfunction is improved but not completely resolved  Thoracic T1 - T4 details:     Examination Method:  Asymmetry, Misalignment, Crepitation, Defects, Masses and Tenderness, Pain    Severity:  Mild    Treatment Method:  High Velocity, Low Amplitude Treatment and Muscle Energy Treatment    Response:  Resolved    Thoracic T5 - T9 details:     Examination Method:  Tissue Texture Change, Stability, Laxity, Effusions, Tone and Tenderness, Pain    Severity:  Mild    Treatment Method:  Muscle Energy Treatment and Soft Tissue Treatment    Response:  Resolved - The somatic dysfunction is completely resolved without evidence of it ever having been present  Lumbar details:     Examination Method:  Asymmetry, Misalignment, Crepitation, Defects, Masses and Tenderness, Pain    Severity:  Mild    Treatment Method:  High Velocity, Low Amplitude Treatment and Soft Tissue Treatment    Response:  Resolved - The somatic dysfunction is completely resolved without evidence of it ever having been present      Sacrum/Pelvis details:     Examination Method:  Asymmetry, Misalignment, Crepitation, Defects, Masses    Severity:  Mild    Treatment Method:  High Velocity, Low Amplitude Treatment    Response:  Resolved - The somatic dysfunction is completely resolved without evidence of it ever having been present  Pelvis Innominate details:     Examination Method:  Asymmetry, Misalignment, Crepitation, Defects, Masses    Severity:  Mild    Treatment Method:  High Velocity, Low Amplitude Treatment    Response:  Resolved - The somatic dysfunction is completely resolved without evidence of it ever having been present  Ribs details:     Examination Method:  Asymmetry, Misalignment, Crepitation, Defects, Masses and Tenderness, Pain    Severity:  Mild    Treatment Method:  Muscle Energy Treatment and Soft Tissue Treatment    Response:  Improved - The somatic dysfunction is improved but not completely resolved      Total Regions Treated:  7

## 2019-09-20 ENCOUNTER — OFFICE VISIT (OUTPATIENT)
Dept: FAMILY MEDICINE CLINIC | Facility: CLINIC | Age: 69
End: 2019-09-20
Payer: MEDICARE

## 2019-09-20 DIAGNOSIS — M43.6 NECK STIFFNESS: ICD-10-CM

## 2019-09-20 DIAGNOSIS — M99.08 SOMATIC DYSFUNCTION OF RIB REGION: ICD-10-CM

## 2019-09-20 DIAGNOSIS — S16.1XXA CERVICAL STRAIN, INITIAL ENCOUNTER: ICD-10-CM

## 2019-09-20 DIAGNOSIS — M99.01 CERVICAL SOMATIC DYSFUNCTION: ICD-10-CM

## 2019-09-20 DIAGNOSIS — M54.2 NECK PAIN ON RIGHT SIDE: Primary | ICD-10-CM

## 2019-09-20 DIAGNOSIS — S29.012A STRAIN OF MUSCLE AND TENDON OF BACK WALL OF THORAX, INITIAL ENCOUNTER: ICD-10-CM

## 2019-09-20 DIAGNOSIS — M99.02 SOMATIC DYSFUNCTION OF THORACIC REGION: ICD-10-CM

## 2019-09-20 DIAGNOSIS — S23.41XA RIB SPRAIN, INITIAL ENCOUNTER: ICD-10-CM

## 2019-09-20 PROCEDURE — 98926 OSTEOPATH MANJ 3-4 REGIONS: CPT | Performed by: FAMILY MEDICINE

## 2019-09-20 PROCEDURE — 99214 OFFICE O/P EST MOD 30 MIN: CPT | Performed by: FAMILY MEDICINE

## 2019-09-20 NOTE — PROGRESS NOTES
Chief Complaint   Patient presents with    Neck Pain     Assessment/Plan:  Water for hydration  Take breaks more frequently when dancing  Diagnoses and all orders for this visit:    Neck pain on right side    Neck stiffness    Cervical somatic dysfunction    Cervical strain, initial encounter    Rib sprain, initial encounter    Somatic dysfunction of rib region    Somatic dysfunction of thoracic region    Strain of muscle and tendon of back wall of thorax, initial encounter          Subjective:      Patient ID: Rae Yepez is a 70 y o  female  Right upper back and right neck pain  Doing ball room dancing, very physical       The following portions of the patient's history were reviewed and updated as appropriate: allergies, current medications, past social history and problem list     Review of Systems   Constitutional: Negative  HENT: Negative  Eyes: Negative  Respiratory: Negative  Cardiovascular: Negative  Musculoskeletal: Positive for back pain, neck pain and neck stiffness  Skin: Negative  Neurological: Negative  Psychiatric/Behavioral: Negative            Objective:      LMP  (LMP Unknown)       Current Outpatient Medications:     aspirin 81 mg chewable tablet, Chew 81 mg, Disp: , Rfl:     Cholecalciferol 2000 units TABS, Take 2,000 Units by mouth, Disp: , Rfl:     diclofenac sodium (VOLTAREN) 1 %, Apply 2 g topically 4 (four) times a day, Disp: 100 g, Rfl: 0    ezetimibe (ZETIA) 10 mg tablet, Take 10 mg by mouth, Disp: , Rfl:     levothyroxine 150 mcg tablet, Take 0 15 mg by mouth , Disp: , Rfl:     losartan (COZAAR) 50 mg tablet, Take 100 mg by mouth daily , Disp: , Rfl:     omeprazole (PriLOSEC) 40 MG capsule, Take 40 mg by mouth, Disp: , Rfl:     rosuvastatin (CRESTOR) 40 MG tablet, Take 1 tablet by mouth daily, Disp: , Rfl:     sertraline (ZOLOFT) 100 mg tablet, 250 mg  , Disp: , Rfl:      Allergies   Allergen Reactions    Latex     Trimethoprim Action Taken: hives; Past Surgical History:   Procedure Laterality Date    CATARACT EXTRACTION          Physical Exam   Constitutional: She is oriented to person, place, and time  She appears well-developed and well-nourished  HENT:   Head: Normocephalic and atraumatic  Cardiovascular: Normal rate  Pulmonary/Chest: Effort normal    Abdominal: Soft  Bowel sounds are normal    Musculoskeletal:   Sit: Roib #1 right anterior / superior and tender  Tender soft tissue right upper cervical, foldings present  Supine: T1 SB R, rotated left  Cervical rotation to right is 1/2 that of to the left  Neurological: She is alert and oriented to person, place, and time  Skin: Skin is warm and dry     Psychiatric: Her behavior is normal  Judgment and thought content normal

## 2019-09-23 NOTE — PROGRESS NOTES
OMT  Performed by: Izabela French DO  Authorized by: Izabela French DO     Verbal consent obtained?: Yes    Risks and benefits: Risks, benefits and alternatives were discussed    Consent given by:  Patient  Procedure Details:     Region evaluated and treated:  Cervical, Thoracic T1 - T4 and Ribs    Cervical Details:     Examination Method:  Asymmetry, Misalignment, Crepitation, Defects, Masses, Tenderness, Pain, Tissue Texture Change, Stability, Laxity, Effusions, Tone and Range of Motion, Contracture    Severity:  Mild    Treatment Method:  Muscle Energy Treatment, Myofascial Release Treatment and Soft Tissue Treatment    Response:  Improved - The somatic dysfunction is improved but not completely resolved  Thoracic T1 - T4 details:     Examination Method:  Asymmetry, Misalignment, Crepitation, Defects, Masses and Tenderness, Pain    Severity:  Mild    Treatment Method:  High Velocity, Low Amplitude Treatment and Soft Tissue Treatment    Response:  Resolved    Ribs details:     Examination Method:  Asymmetry, Misalignment, Crepitation, Defects, Masses and Tenderness, Pain    Severity:  Mild    Treatment Method:  High Velocity, Low Amplitude Treatment and Soft Tissue Treatment    Response:  Resolved - The somatic dysfunction is completely resolved without evidence of it ever having been present      Total Regions Treated:  3

## 2019-11-29 ENCOUNTER — OFFICE VISIT (OUTPATIENT)
Dept: FAMILY MEDICINE CLINIC | Facility: CLINIC | Age: 69
End: 2019-11-29
Payer: MEDICARE

## 2019-11-29 DIAGNOSIS — M99.05 PELVIC SOMATIC DYSFUNCTION: ICD-10-CM

## 2019-11-29 DIAGNOSIS — M54.2 NECK PAIN ON RIGHT SIDE: ICD-10-CM

## 2019-11-29 DIAGNOSIS — S16.1XXA CERVICAL STRAIN, INITIAL ENCOUNTER: ICD-10-CM

## 2019-11-29 DIAGNOSIS — M99.01 CERVICAL SOMATIC DYSFUNCTION: ICD-10-CM

## 2019-11-29 DIAGNOSIS — G89.29 CHRONIC RIGHT SHOULDER PAIN: ICD-10-CM

## 2019-11-29 DIAGNOSIS — S29.012A STRAIN OF MUSCLE AND TENDON OF BACK WALL OF THORAX, INITIAL ENCOUNTER: ICD-10-CM

## 2019-11-29 DIAGNOSIS — S23.41XA RIB SPRAIN, INITIAL ENCOUNTER: ICD-10-CM

## 2019-11-29 DIAGNOSIS — S39.012A SACROILIAC STRAIN, INITIAL ENCOUNTER: ICD-10-CM

## 2019-11-29 DIAGNOSIS — M99.08 SEGMENTAL AND SOMATIC DYSFUNCTION OF RIB CAGE: ICD-10-CM

## 2019-11-29 DIAGNOSIS — S39.012A STRAIN, SACRAL, INITIAL ENCOUNTER: ICD-10-CM

## 2019-11-29 DIAGNOSIS — M99.02 SOMATIC DYSFUNCTION OF THORACIC REGION: ICD-10-CM

## 2019-11-29 DIAGNOSIS — M25.511 CHRONIC RIGHT SHOULDER PAIN: ICD-10-CM

## 2019-11-29 DIAGNOSIS — S39.013A STRAIN OF MUSCLE, FASCIA AND TENDON OF PELVIS, INITIAL ENCOUNTER: ICD-10-CM

## 2019-11-29 DIAGNOSIS — M54.9 UPPER BACK PAIN ON RIGHT SIDE: ICD-10-CM

## 2019-11-29 DIAGNOSIS — M54.50 ACUTE RIGHT-SIDED LOW BACK PAIN WITHOUT SCIATICA: Primary | ICD-10-CM

## 2019-11-29 PROCEDURE — 99214 OFFICE O/P EST MOD 30 MIN: CPT | Performed by: FAMILY MEDICINE

## 2019-11-29 PROCEDURE — 98927 OSTEOPATH MANJ 5-6 REGIONS: CPT | Performed by: FAMILY MEDICINE

## 2019-11-29 NOTE — PROGRESS NOTES
Chief Complaint   Patient presents with    Pain     bilateral clavicle   Assessment/Plan:  Gentle stretching, no heat toa areas  No problem-specific Assessment & Plan notes found for this encounter  Diagnoses and all orders for this visit:    Acute right-sided low back pain without sciatica    Upper back pain on right side    Chronic right shoulder pain    Rib sprain, initial encounter    Segmental and somatic dysfunction of rib cage    Sacroiliac strain, initial encounter    Strain, sacral, initial encounter    Pelvic somatic dysfunction    Strain of muscle, fascia and tendon of pelvis, initial encounter    Neck pain on right side    Cervical somatic dysfunction    Cervical strain, initial encounter    Somatic dysfunction of thoracic region    Strain of muscle and tendon of back wall of thorax, initial encounter          Subjective:      Patient ID: Minus Schaumann is a 78 y o  female  BL shoulders, clavicals, upper thorax, low back and neck pain  Doing ball room dancing, gets in awkward positions with dancing  Also has right low back pain  The following portions of the patient's history were reviewed and updated as appropriate: allergies, current medications, past medical history, past social history and problem list     Review of Systems   Constitutional: Negative  HENT: Negative  Eyes: Negative  Respiratory: Negative  Cardiovascular: Negative  Gastrointestinal: Negative  Genitourinary: Negative  Musculoskeletal: Positive for back pain, neck pain and neck stiffness  Skin: Negative  Neurological: Negative  Psychiatric/Behavioral: Negative  Objective:      LMP  (LMP Unknown)          Physical Exam   Constitutional: She appears well-developed and well-nourished  HENT:   Head: Normocephalic and atraumatic  Cardiovascular: Normal rate and regular rhythm     Pulmonary/Chest: Effort normal and breath sounds normal    Musculoskeletal:   Stand: superior right innominate  Sit: Rib #1 left posterior/inferior and tender  Prone: Superior right PSIS, Sascrum: L on R torsion  Skin: Skin is warm and dry  Psychiatric: She has a normal mood and affect   Her behavior is normal  Judgment and thought content normal

## 2019-12-01 VITALS — DIASTOLIC BLOOD PRESSURE: 88 MMHG | HEART RATE: 80 BPM | SYSTOLIC BLOOD PRESSURE: 140 MMHG | TEMPERATURE: 98 F

## 2019-12-01 NOTE — PROGRESS NOTES
OMT  Performed by: Ruben Mendoza DO  Authorized by: Ruben Mendoza DO     Verbal consent obtained?: Yes    Risks and benefits: Risks, benefits and alternatives were discussed    Consent given by:  Patient  Procedure Details:     Region evaluated and treated:  Cervical, Thoracic T1 - T4, Ribs, Sacrum/Pelvis and Pelvis Innominate    Cervical Details:     Examination Method:  Tissue Texture Change, Stability, Laxity, Effusions, Tone, Asymmetry, Misalignment, Crepitation, Defects, Masses and Tenderness, Pain    Severity:  Mild    Treatment Method:  Soft Tissue Treatment and Muscle Energy Treatment    Response:  Improved - The somatic dysfunction is improved but not completely resolved  Thoracic T1 - T4 details:     Examination Method:  Asymmetry, Misalignment, Crepitation, Defects, Masses and Tenderness, Pain    Severity:  Mild    Treatment Method:  Soft Tissue Treatment and Muscle Energy Treatment    Response:  Improved    Sacrum/Pelvis details:     Examination Method:  Asymmetry, Misalignment, Crepitation, Defects, Masses and Tenderness, Pain    Severity:  Mild    Treatment Method:  High Velocity, Low Amplitude Treatment and Soft Tissue Treatment    Response:  Resolved - The somatic dysfunction is completely resolved without evidence of it ever having been present  Pelvis Innominate details:     Examination Method:  Asymmetry, Misalignment, Crepitation, Defects, Masses and Tenderness, Pain    Severity:  Mild    Treatment Method:  High Velocity, Low Amplitude Treatment and Soft Tissue Treatment    Response:  Resolved - The somatic dysfunction is completely resolved without evidence of it ever having been present      Ribs details:     Examination Method:  Asymmetry, Misalignment, Crepitation, Defects, Masses, Tenderness, Pain and Tissue Texture Change, Stability, Laxity, Effusions, Tone    Severity:  Mild    Treatment Method:  Myofascial Release Treatment, Soft Tissue Treatment, Muscle Energy Treatment and High Velocity, Low Amplitude Treatment    Response:  Improved - The somatic dysfunction is improved but not completely resolved      Total Regions Treated:  5

## 2019-12-13 ENCOUNTER — TELEPHONE (OUTPATIENT)
Dept: OTHER | Facility: OTHER | Age: 69
End: 2019-12-13

## 2019-12-13 NOTE — TELEPHONE ENCOUNTER
Called to cancel appointment today she does not want to reschedule with the service  She said she would call back when the office is open

## 2020-01-14 ENCOUNTER — OFFICE VISIT (OUTPATIENT)
Dept: FAMILY MEDICINE CLINIC | Facility: CLINIC | Age: 70
End: 2020-01-14
Payer: MEDICARE

## 2020-01-14 VITALS
TEMPERATURE: 98.2 F | SYSTOLIC BLOOD PRESSURE: 134 MMHG | HEART RATE: 75 BPM | DIASTOLIC BLOOD PRESSURE: 86 MMHG | OXYGEN SATURATION: 97 %

## 2020-01-14 DIAGNOSIS — S39.013A STRAIN OF MUSCLE, FASCIA AND TENDON OF PELVIS, INITIAL ENCOUNTER: ICD-10-CM

## 2020-01-14 DIAGNOSIS — S16.1XXA CERVICAL STRAIN, INITIAL ENCOUNTER: ICD-10-CM

## 2020-01-14 DIAGNOSIS — M99.05 PELVIC SOMATIC DYSFUNCTION: ICD-10-CM

## 2020-01-14 DIAGNOSIS — M54.2 NECK PAIN: Primary | ICD-10-CM

## 2020-01-14 DIAGNOSIS — M54.9 UPPER BACK PAIN ON RIGHT SIDE: ICD-10-CM

## 2020-01-14 DIAGNOSIS — M99.08 SEGMENTAL AND SOMATIC DYSFUNCTION OF RIB CAGE: ICD-10-CM

## 2020-01-14 DIAGNOSIS — M54.50 ACUTE RIGHT-SIDED LOW BACK PAIN WITHOUT SCIATICA: ICD-10-CM

## 2020-01-14 DIAGNOSIS — M99.01 CERVICAL SOMATIC DYSFUNCTION: ICD-10-CM

## 2020-01-14 DIAGNOSIS — M99.03 SEGMENTAL AND SOMATIC DYSFUNCTION OF LUMBAR REGION: ICD-10-CM

## 2020-01-14 DIAGNOSIS — S23.41XA RIB SPRAIN, INITIAL ENCOUNTER: ICD-10-CM

## 2020-01-14 DIAGNOSIS — S39.012A STRAIN, SACRAL, INITIAL ENCOUNTER: ICD-10-CM

## 2020-01-14 DIAGNOSIS — M99.04 SACRAL REGION SOMATIC DYSFUNCTION: ICD-10-CM

## 2020-01-14 DIAGNOSIS — S29.012A STRAIN OF MUSCLE AND TENDON OF BACK WALL OF THORAX, INITIAL ENCOUNTER: ICD-10-CM

## 2020-01-14 DIAGNOSIS — M99.02 SOMATIC DYSFUNCTION OF THORACIC REGION: ICD-10-CM

## 2020-01-14 DIAGNOSIS — S39.012A LUMBAR STRAIN, INITIAL ENCOUNTER: ICD-10-CM

## 2020-01-14 PROCEDURE — 99214 OFFICE O/P EST MOD 30 MIN: CPT | Performed by: FAMILY MEDICINE

## 2020-01-14 PROCEDURE — 98928 OSTEOPATH MANJ 7-8 REGIONS: CPT | Performed by: FAMILY MEDICINE

## 2020-01-14 RX ORDER — AMLODIPINE BESYLATE 2.5 MG/1
5 TABLET ORAL
COMMUNITY
Start: 2019-11-06

## 2020-01-14 NOTE — PROGRESS NOTES
OMT  Performed by: Hanna Rene DO  Authorized by: Hanna Rene DO     Verbal consent obtained?: Yes    Risks and benefits: Risks, benefits and alternatives were discussed    Consent given by:  Patient  Procedure Details:     Region evaluated and treated:  Cervical, Thoracic T1 - T4, Thoracic T5 - T9, Ribs, Lumbar, Sacrum/Pelvis and Pelvis Innominate    Cervical Details:     Examination Method:  Asymmetry, Misalignment, Crepitation, Defects, Masses and Tenderness, Pain    Severity:  Mild    Treatment Method:  Soft Tissue Treatment and High Velocity, Low Amplitude Treatment    Response:  Resolved - The somatic dysfunction is completely resolved without evidence of it ever having been present  Thoracic T1 - T4 details:     Examination Method:  Asymmetry, Misalignment, Crepitation, Defects, Masses and Tenderness, Pain    Severity:  Mild    Treatment Method:  Soft Tissue Treatment, Muscle Energy Treatment and High Velocity, Low Amplitude Treatment    Response:  Resolved    Thoracic T5 - T9 details:     Examination Method:  Asymmetry, Misalignment, Crepitation, Defects, Masses and Tenderness, Pain    Severity:  Mild    Treatment Method:  High Velocity, Low Amplitude Treatment    Response:  Resolved - The somatic dysfunction is completely resolved without evidence of it ever having been present  Lumbar details:     Examination Method:  Asymmetry, Misalignment, Crepitation, Defects, Masses and Tenderness, Pain    Severity:  Mild    Treatment Method:  Muscle Energy Treatment and Soft Tissue Treatment    Response:  Improved - The somatic dysfunction is improved but not completely resolved      Sacrum/Pelvis details:     Examination Method:  Asymmetry, Misalignment, Crepitation, Defects, Masses and Tenderness, Pain    Severity:  Mild    Treatment Method:  High Velocity, Low Amplitude Treatment and Soft Tissue Treatment    Response:  Resolved - The somatic dysfunction is completely resolved without evidence of it ever having been present  Pelvis Innominate details:     Examination Method:  Asymmetry, Misalignment, Crepitation, Defects, Masses and Tenderness, Pain    Severity:  Mild    Treatment Method:  High Velocity, Low Amplitude Treatment and Soft Tissue Treatment    Response:  Resolved - The somatic dysfunction is completely resolved without evidence of it ever having been present  Ribs details:     Examination Method:  Asymmetry, Misalignment, Crepitation, Defects, Masses and Tenderness, Pain    Severity:  Mild    Treatment Method:  Soft Tissue Treatment, Muscle Energy Treatment and High Velocity, Low Amplitude Treatment    Response:  Resolved - The somatic dysfunction is completely resolved without evidence of it ever having been present      Total Regions Treated:  7

## 2020-01-14 NOTE — PROGRESS NOTES
Chief Complaint   Patient presents with    Hip Pain    Shoulder Pain    Neck Pain    Back Pain     Assessment/Plan:  Hold on dancing  No heat  Diagnoses and all orders for this visit:    Neck pain    Acute right-sided low back pain without sciatica    Cervical somatic dysfunction    Cervical strain, initial encounter    Somatic dysfunction of thoracic region    Strain of muscle and tendon of back wall of thorax, initial encounter    Rib sprain, initial encounter    Segmental and somatic dysfunction of rib cage    Pelvic somatic dysfunction    Strain of muscle, fascia and tendon of pelvis, initial encounter    Sacral region somatic dysfunction    Strain, sacral, initial encounter    Lumbar strain, initial encounter    Segmental and somatic dysfunction of lumbar region    Other orders  -     amLODIPine (NORVASC) 2 5 mg tablet; Take 5 mg by mouth           Subjective:      Patient ID: Elin Carter is a 78 y o  female  Here for several reasons  Does ball room dancing  R Low back, R superior trap, cervical pain  Denies radicular  The following portions of the patient's history were reviewed and updated as appropriate: allergies, current medications, past medical history, past social history and problem list     Review of Systems   Constitutional: Negative  HENT: Negative  Respiratory: Negative  Cardiovascular: Negative  Gastrointestinal: Negative  Genitourinary: Negative  Musculoskeletal: Positive for back pain, neck pain and neck stiffness  Skin: Negative  Neurological: Negative  Psychiatric/Behavioral: Negative            Objective:      /86 (BP Location: Left arm, Patient Position: Sitting, Cuff Size: Standard)   Pulse 75   Temp 98 2 °F (36 8 °C) (Oral)   LMP  (LMP Unknown)   SpO2 97%       Current Outpatient Medications:     amLODIPine (NORVASC) 2 5 mg tablet, Take 5 mg by mouth , Disp: , Rfl:     aspirin 81 mg chewable tablet, Chew 81 mg, Disp: , Rfl:   Cholecalciferol 2000 units TABS, Take 2,000 Units by mouth, Disp: , Rfl:     diclofenac sodium (VOLTAREN) 1 %, Apply 2 g topically 4 (four) times a day, Disp: 100 g, Rfl: 0    ezetimibe (ZETIA) 10 mg tablet, Take 10 mg by mouth, Disp: , Rfl:     levothyroxine 150 mcg tablet, Take 125 mcg by mouth , Disp: , Rfl:     losartan (COZAAR) 50 mg tablet, Take 100 mg by mouth daily , Disp: , Rfl:     omeprazole (PriLOSEC) 40 MG capsule, Take 40 mg by mouth, Disp: , Rfl:     rosuvastatin (CRESTOR) 40 MG tablet, Take 1 tablet by mouth daily, Disp: , Rfl:     sertraline (ZOLOFT) 100 mg tablet, 250 mg  , Disp: , Rfl:      Allergies   Allergen Reactions    Latex     Trimethoprim      Action Taken: hives; Physical Exam   Constitutional: She is oriented to person, place, and time  She appears well-developed and well-nourished  HENT:   Head: Normocephalic and atraumatic  Cardiovascular: Normal rate  Pulmonary/Chest: Effort normal    Musculoskeletal:   STRONG presentation with standing, tender R SI   Prone: Inferior left PSIS, Sacrum: L on R torsion, tender BL PATEL's  Few mid TV foldings and tender  Mid lumbar rotated right and tender  Sit: Rib #1 right anterior - tender, T1 rotated left   Neurological: She is alert and oriented to person, place, and time  Skin: Skin is warm and dry  Psychiatric: She has a normal mood and affect   Her behavior is normal  Judgment and thought content normal

## 2020-01-17 ENCOUNTER — OFFICE VISIT (OUTPATIENT)
Dept: FAMILY MEDICINE CLINIC | Facility: CLINIC | Age: 70
End: 2020-01-17
Payer: MEDICARE

## 2020-01-17 DIAGNOSIS — M99.01 CERVICAL SOMATIC DYSFUNCTION: ICD-10-CM

## 2020-01-17 DIAGNOSIS — M54.9 UPPER BACK PAIN ON RIGHT SIDE: ICD-10-CM

## 2020-01-17 DIAGNOSIS — M25.531 WRIST PAIN, ACUTE, RIGHT: ICD-10-CM

## 2020-01-17 DIAGNOSIS — S46.919A MUSCLE STRAIN OF UPPER EXTREMITY, INITIAL ENCOUNTER: ICD-10-CM

## 2020-01-17 DIAGNOSIS — M99.03 SEGMENTAL AND SOMATIC DYSFUNCTION OF LUMBAR REGION: ICD-10-CM

## 2020-01-17 DIAGNOSIS — M99.02 SOMATIC DYSFUNCTION OF THORACIC REGION: ICD-10-CM

## 2020-01-17 DIAGNOSIS — M99.08 SEGMENTAL AND SOMATIC DYSFUNCTION OF RIB CAGE: ICD-10-CM

## 2020-01-17 DIAGNOSIS — S39.013D STRAIN OF MUSCLE, FASCIA AND TENDON OF PELVIS, SUBSEQUENT ENCOUNTER: ICD-10-CM

## 2020-01-17 DIAGNOSIS — M99.05 PELVIC SOMATIC DYSFUNCTION: ICD-10-CM

## 2020-01-17 DIAGNOSIS — S23.41XD RIB SPRAIN, SUBSEQUENT ENCOUNTER: ICD-10-CM

## 2020-01-17 DIAGNOSIS — M99.04 SACRAL REGION SOMATIC DYSFUNCTION: ICD-10-CM

## 2020-01-17 DIAGNOSIS — S29.012D STRAIN OF MUSCLE AND TENDON OF BACK WALL OF THORAX, SUBSEQUENT ENCOUNTER: ICD-10-CM

## 2020-01-17 DIAGNOSIS — S39.012D LUMBAR STRAIN, SUBSEQUENT ENCOUNTER: ICD-10-CM

## 2020-01-17 DIAGNOSIS — M99.07 UPPER EXTREMITY SOMATIC DYSFUNCTION: ICD-10-CM

## 2020-01-17 DIAGNOSIS — M54.2 NECK PAIN: ICD-10-CM

## 2020-01-17 DIAGNOSIS — G89.29 CHRONIC RIGHT-SIDED LOW BACK PAIN WITHOUT SCIATICA: Primary | ICD-10-CM

## 2020-01-17 DIAGNOSIS — S39.012D STRAIN, SACRAL, SUBSEQUENT ENCOUNTER: ICD-10-CM

## 2020-01-17 DIAGNOSIS — M54.50 CHRONIC RIGHT-SIDED LOW BACK PAIN WITHOUT SCIATICA: Primary | ICD-10-CM

## 2020-01-17 DIAGNOSIS — S16.1XXD CERVICAL STRAIN, SUBSEQUENT ENCOUNTER: ICD-10-CM

## 2020-01-17 PROCEDURE — 99215 OFFICE O/P EST HI 40 MIN: CPT | Performed by: FAMILY MEDICINE

## 2020-01-17 PROCEDURE — 98928 OSTEOPATH MANJ 7-8 REGIONS: CPT | Performed by: FAMILY MEDICINE

## 2020-01-17 NOTE — PROGRESS NOTES
Chief Complaint   Patient presents with    Follow-up    Hip Pain     Assessment/Plan:  Discussed taking wrist to extremes of motion  Reviewed exeercise routine duration, intendity and recoup time needed before repeating dancing routine  Diagnoses and all orders for this visit:    Chronic right-sided low back pain without sciatica    Neck pain    Upper back pain on right side    Wrist pain, acute, right    Cervical somatic dysfunction    Cervical strain, subsequent encounter    Rib sprain, subsequent encounter    Segmental and somatic dysfunction of rib cage    Somatic dysfunction of thoracic region    Strain of muscle and tendon of back wall of thorax, subsequent encounter    Lumbar strain, subsequent encounter    Segmental and somatic dysfunction of lumbar region    Sacral region somatic dysfunction    Strain, sacral, subsequent encounter    Pelvic somatic dysfunction    Strain of muscle, fascia and tendon of pelvis, subsequent encounter    Muscle strain of upper extremity, initial encounter    Upper extremity somatic dysfunction          Subjective:      Patient ID: Tati Wilkins is a 78 y o  female  Right low back and right posterior superior crest pains  Some right upper thorax, CT junction  Right wrist pains  Patient is right handed  Continues with [employer]  The following portions of the patient's history were reviewed and updated as appropriate: allergies, current medications, past social history and problem list   I have spent 40 minutes with Patient  today in which greater than 50% of this time was spent in counseling/coordination of care regarding Prognosis, Risks and benefits of tx options, Intructions for management, Importance of tx compliance, Risk factor reductions and Impressions  Review of Systems   Constitutional: Negative  Respiratory: Negative  Cardiovascular: Negative  Musculoskeletal: Positive for back pain, neck pain and neck stiffness     Skin: Negative  Neurological: Negative  Psychiatric/Behavioral: Negative  Objective:      LMP  (LMP Unknown)          Physical Exam   Constitutional: She is oriented to person, place, and time  She appears well-developed and well-nourished  HENT:   Head: Normocephalic and atraumatic  Cardiovascular: Normal rate  Pulmonary/Chest: Effort normal    Musculoskeletal:   Stand: STRONG presentation  Sacrum: R on R torsion  Prone: Inferior L PSIS, mid lumbar rotated right  Sit: Rib #1 left posterior, T1 rotated left, low cervical rotated right, areas tender on right  R wrist: Tender base of thumb with mild swelling  Neurological: She is alert and oriented to person, place, and time  Skin: Skin is warm and dry

## 2020-01-18 NOTE — PROGRESS NOTES
OMT  Performed by: Hanna Rene DO  Authorized by: Hanna Rene DO     Verbal consent obtained?: Yes    Risks and benefits: Risks, benefits and alternatives were discussed    Consent given by:  Patient  Procedure Details:     Region evaluated and treated:  Cervical, Thoracic T1 - T4, Lumbar, Sacrum/Pelvis, Pelvis Innominate, Ribs and Right Upper Extremity    Cervical Details:     Examination Method:  Asymmetry, Misalignment, Crepitation, Defects, Masses and Tenderness, Pain    Severity:  Mild    Treatment Method:  Soft Tissue Treatment, Muscle Energy Treatment and High Velocity, Low Amplitude Treatment    Response:  Improved - The somatic dysfunction is improved but not completely resolved  Thoracic T1 - T4 details:     Examination Method:  Asymmetry, Misalignment, Crepitation, Defects, Masses and Tenderness, Pain    Severity:  Mild    Treatment Method:  Soft Tissue Treatment and High Velocity, Low Amplitude Treatment    Response:  Resolved    Lumbar details:     Examination Method:  Asymmetry, Misalignment, Crepitation, Defects, Masses and Tenderness, Pain    Severity:  Mild    Treatment Method:  Soft Tissue Treatment, Muscle Energy Treatment and High Velocity, Low Amplitude Treatment    Response:  Improved - The somatic dysfunction is improved but not completely resolved  Sacrum/Pelvis details:     Examination Method:  Asymmetry, Misalignment, Crepitation, Defects, Masses and Tenderness, Pain    Severity:  Mild    Treatment Method:  High Velocity, Low Amplitude Treatment, Soft Tissue Treatment and Muscle Energy Treatment    Response:  Improved - The somatic dysfunction is improved but not completely resolved      Pelvis Innominate details:     Examination Method:  Asymmetry, Misalignment, Crepitation, Defects, Masses and Tenderness, Pain    Severity:  Mild    Treatment Method:  High Velocity, Low Amplitude Treatment and Muscle Energy Treatment    Response:  Resolved - The somatic dysfunction is completely resolved without evidence of it ever having been present  Right Upper Extremity details:     Examination Method:  Tissue Texture Change, Stability, Laxity, Effusions, Tone, Asymmetry, Misalignment, Crepitation, Defects, Masses and Tenderness, Pain    Severity:  Mild    Treatment Method:  Soft Tissue Treatment, Myofascial Release Treatment and Muscle Energy Treatment    Response:  Unchanged - The somatic dysfunction is unchanged or the same after treatment  Ribs details:     Examination Method:  Asymmetry, Misalignment, Crepitation, Defects, Masses and Tenderness, Pain    Severity:  Mild    Treatment Method:  Muscle Energy Treatment, Soft Tissue Treatment and High Velocity, Low Amplitude Treatment    Response:  Resolved - The somatic dysfunction is completely resolved without evidence of it ever having been present      Total Regions Treated:  7

## 2020-01-24 ENCOUNTER — TELEPHONE (OUTPATIENT)
Dept: OTHER | Facility: OTHER | Age: 70
End: 2020-01-24

## 2020-01-24 NOTE — TELEPHONE ENCOUNTER
[id number]: Voice mail message: [id number]: Voice mail message retrieved, Patient is calling to cancel her appointment

## 2020-01-29 ENCOUNTER — OFFICE VISIT (OUTPATIENT)
Dept: FAMILY MEDICINE CLINIC | Facility: CLINIC | Age: 70
End: 2020-01-29
Payer: MEDICARE

## 2020-01-29 DIAGNOSIS — S39.013D STRAIN OF MUSCLE, FASCIA AND TENDON OF PELVIS, SUBSEQUENT ENCOUNTER: ICD-10-CM

## 2020-01-29 DIAGNOSIS — S16.1XXD CERVICAL STRAIN, SUBSEQUENT ENCOUNTER: ICD-10-CM

## 2020-01-29 DIAGNOSIS — M99.02 SOMATIC DYSFUNCTION OF THORACIC REGION: ICD-10-CM

## 2020-01-29 DIAGNOSIS — S29.012D STRAIN OF MUSCLE AND TENDON OF BACK WALL OF THORAX, SUBSEQUENT ENCOUNTER: ICD-10-CM

## 2020-01-29 DIAGNOSIS — M54.2 NECK PAIN ON RIGHT SIDE: Primary | ICD-10-CM

## 2020-01-29 DIAGNOSIS — M99.08 SEGMENTAL AND SOMATIC DYSFUNCTION OF RIB CAGE: ICD-10-CM

## 2020-01-29 DIAGNOSIS — M99.03 SEGMENTAL AND SOMATIC DYSFUNCTION OF LUMBAR REGION: ICD-10-CM

## 2020-01-29 DIAGNOSIS — M54.50 ACUTE RIGHT-SIDED LOW BACK PAIN WITHOUT SCIATICA: ICD-10-CM

## 2020-01-29 DIAGNOSIS — M54.6 ACUTE BILATERAL THORACIC BACK PAIN: ICD-10-CM

## 2020-01-29 DIAGNOSIS — M99.01 CERVICAL SOMATIC DYSFUNCTION: ICD-10-CM

## 2020-01-29 DIAGNOSIS — S39.012D LUMBAR STRAIN, SUBSEQUENT ENCOUNTER: ICD-10-CM

## 2020-01-29 DIAGNOSIS — M99.04 SACRAL REGION SOMATIC DYSFUNCTION: ICD-10-CM

## 2020-01-29 DIAGNOSIS — S39.012D STRAIN, SACRAL, SUBSEQUENT ENCOUNTER: ICD-10-CM

## 2020-01-29 DIAGNOSIS — S23.41XD RIB SPRAIN, SUBSEQUENT ENCOUNTER: ICD-10-CM

## 2020-01-29 DIAGNOSIS — M99.05 PELVIC SOMATIC DYSFUNCTION: ICD-10-CM

## 2020-01-29 PROCEDURE — 98928 OSTEOPATH MANJ 7-8 REGIONS: CPT | Performed by: FAMILY MEDICINE

## 2020-01-29 NOTE — PROGRESS NOTES
Chief Complaint   Patient presents with    Follow-up     1 week f/u-manip    Back Pain       Assessment/Plan:  Hold up with dancing  No heat  Diagnoses and all orders for this visit:    Neck pain on right side    Acute right-sided low back pain without sciatica    Acute bilateral thoracic back pain    Cervical somatic dysfunction    Cervical strain, subsequent encounter    Rib sprain, subsequent encounter    Segmental and somatic dysfunction of rib cage    Somatic dysfunction of thoracic region    Strain of muscle and tendon of back wall of thorax, subsequent encounter    Pelvic somatic dysfunction    Strain of muscle, fascia and tendon of pelvis, subsequent encounter    Sacral region somatic dysfunction    Strain, sacral, subsequent encounter    Segmental and somatic dysfunction of lumbar region    Lumbar strain, subsequent encounter          Subjective:      Patient ID: Patricia Chapman is a 78 y o  female  Right low back and right low cervical, CT junction  The following portions of the patient's history were reviewed and updated as appropriate: allergies, current medications, past family history, past medical history, past social history and problem list     Review of Systems   Constitutional: Negative  HENT: Negative  Respiratory: Negative  Cardiovascular: Negative  Musculoskeletal: Positive for back pain, neck pain and neck stiffness  Skin: Negative  Neurological: Negative  Psychiatric/Behavioral: Negative            Objective:      LMP  (LMP Unknown)       Current Outpatient Medications:     amLODIPine (NORVASC) 2 5 mg tablet, Take 5 mg by mouth , Disp: , Rfl:     aspirin 81 mg chewable tablet, Chew 81 mg, Disp: , Rfl:     Cholecalciferol 2000 units TABS, Take 2,000 Units by mouth, Disp: , Rfl:     diclofenac sodium (VOLTAREN) 1 %, Apply 2 g topically 4 (four) times a day, Disp: 100 g, Rfl: 0    ezetimibe (ZETIA) 10 mg tablet, Take 10 mg by mouth, Disp: , Rfl:    levothyroxine 150 mcg tablet, Take 125 mcg by mouth , Disp: , Rfl:     losartan (COZAAR) 50 mg tablet, Take 100 mg by mouth daily , Disp: , Rfl:     omeprazole (PriLOSEC) 40 MG capsule, Take 40 mg by mouth, Disp: , Rfl:     rosuvastatin (CRESTOR) 40 MG tablet, Take 1 tablet by mouth daily, Disp: , Rfl:     sertraline (ZOLOFT) 100 mg tablet, 250 mg  , Disp: , Rfl:      Physical Exam   Constitutional: She is oriented to person, place, and time  She appears well-developed and well-nourished  HENT:   Head: Normocephalic and atraumatic  Cardiovascular: Normal rate  Pulmonary/Chest: Effort normal    Abdominal: Soft  Musculoskeletal:   Sit: Rib #1 right posterior, inferior and tender  Stand: Superior right innominate, Sacrum; R on R torsion  Supine: T1 rotated right, low cervical rotated left  Low lumbar rotated left and tender on right  Prone:    Neurological: She is alert and oriented to person, place, and time  Skin: Skin is warm and dry  Psychiatric: She has a normal mood and affect   Her behavior is normal  Judgment and thought content normal

## 2020-01-31 NOTE — PROGRESS NOTES
OMT  Performed by: Hanna Rene DO  Authorized by: Hanna Rene DO     Verbal consent obtained?: Yes    Risks and benefits: Risks, benefits and alternatives were discussed    Consent given by:  Patient  Procedure Details:     Region evaluated and treated:  Cervical, Thoracic T1 - T4, Lumbar, Sacrum/Pelvis, Pelvis Innominate, Ribs and Thoracic T5 - T9    Cervical Details:     Examination Method:  Asymmetry, Misalignment, Crepitation, Defects, Masses, Tenderness, Pain and Range of Motion, Contracture    Severity:  Mild    Treatment Method:  Soft Tissue Treatment, Muscle Energy Treatment and Myofascial Release Treatment    Response:  Improved - The somatic dysfunction is improved but not completely resolved  Thoracic T1 - T4 details:     Examination Method:  Asymmetry, Misalignment, Crepitation, Defects, Masses and Tenderness, Pain    Severity:  Mild    Treatment Method:  High Velocity, Low Amplitude Treatment and Soft Tissue Treatment    Response:  Resolved    Thoracic T5 - T9 details:     Examination Method:  Asymmetry, Misalignment, Crepitation, Defects, Masses and Tenderness, Pain    Severity:  Mild    Treatment Method:  High Velocity, Low Amplitude Treatment and Soft Tissue Treatment    Response:  Resolved - The somatic dysfunction is completely resolved without evidence of it ever having been present  Lumbar details:     Examination Method:  Asymmetry, Misalignment, Crepitation, Defects, Masses and Tenderness, Pain    Severity:  Mild    Treatment Method:  Muscle Energy Treatment and Soft Tissue Treatment    Response:  Resolved - The somatic dysfunction is completely resolved without evidence of it ever having been present      Sacrum/Pelvis details:     Examination Method:  Asymmetry, Misalignment, Crepitation, Defects, Masses and Tenderness, Pain    Severity:  Mild    Treatment Method:  High Velocity, Low Amplitude Treatment and Soft Tissue Treatment    Response:  Improved - The somatic dysfunction is improved but not completely resolved  Pelvis Innominate details:     Examination Method:  Asymmetry, Misalignment, Crepitation, Defects, Masses    Severity:  Mild    Treatment Method:  High Velocity, Low Amplitude Treatment and Soft Tissue Treatment    Response:  Resolved - The somatic dysfunction is completely resolved without evidence of it ever having been present  Ribs details:     Examination Method:  Asymmetry, Misalignment, Crepitation, Defects, Masses and Tenderness, Pain    Treatment Method:  High Velocity, Low Amplitude Treatment, Muscle Energy Treatment and Soft Tissue Treatment    Response:  Improved - The somatic dysfunction is improved but not completely resolved      Total Regions Treated:  7

## 2021-03-04 DIAGNOSIS — Z23 ENCOUNTER FOR IMMUNIZATION: ICD-10-CM

## 2023-07-06 ENCOUNTER — APPOINTMENT (OUTPATIENT)
Dept: PHYSICAL THERAPY | Facility: REHABILITATION | Age: 73
End: 2023-07-06
Payer: MEDICARE

## 2023-07-12 ENCOUNTER — APPOINTMENT (OUTPATIENT)
Dept: PHYSICAL THERAPY | Facility: REHABILITATION | Age: 73
End: 2023-07-12
Payer: MEDICARE

## 2023-07-19 ENCOUNTER — APPOINTMENT (OUTPATIENT)
Dept: PHYSICAL THERAPY | Facility: REHABILITATION | Age: 73
End: 2023-07-19
Payer: MEDICARE

## 2023-07-25 ENCOUNTER — OFFICE VISIT (OUTPATIENT)
Dept: PHYSICAL THERAPY | Facility: REHABILITATION | Age: 73
End: 2023-07-25
Payer: MEDICARE

## 2023-07-25 DIAGNOSIS — M25.551 CHRONIC RIGHT HIP PAIN: Primary | ICD-10-CM

## 2023-07-25 DIAGNOSIS — G89.29 CHRONIC RIGHT HIP PAIN: Primary | ICD-10-CM

## 2023-07-25 PROCEDURE — 97162 PT EVAL MOD COMPLEX 30 MIN: CPT | Performed by: PHYSICAL THERAPIST

## 2023-07-25 PROCEDURE — 97110 THERAPEUTIC EXERCISES: CPT | Performed by: PHYSICAL THERAPIST

## 2023-07-25 PROCEDURE — 97112 NEUROMUSCULAR REEDUCATION: CPT | Performed by: PHYSICAL THERAPIST

## 2023-07-25 NOTE — PROGRESS NOTES
PT Evaluation     Today's date: 2023  Patient name: Marcie Mina  : 1950  MRN: [id number]  Referring provider: Diamond Kaufman DO  Dx:   Encounter Diagnosis     ICD-10-CM    1. Chronic right hip pain  M25.551     G89.29                      Assessment  Assessment details: Marcie Mina is a pleasant 67 y.o. female who presents with chronic R hip pain. The patient has been struggling with R hip pain for approx 6 years. She had a severe episode of low back pain at the same time; which combined to cause significant issue with her functional capacity. She has been doing progressively less in the past few years and feels like her weakness and lack of function are going to make the rehab process after a FERNANDO difficult. She wants to improve strength and functional capacity prior to surgery. No further referral appears necessary at this time based upon examination results. Primary movement impairment diagnosis of R hip weakness, R hip hypomobility, balance deficit. Upon physical exam, patient demonstrates compensation to limit RLE weight bearing during gait and STS transfers. R hip flexion and IR mobility deficits limit her ability to perform bed mobility and STS transfers, as well. Patients symptoms appears to be most related to joint degenerative changes, but also likely has a soft tissue component related to tendinopathy and weakness. I discussed these findings with the patient and how her treatment plan will address these. I discussed an initial HEP to address mobility and strength deficits. Pt. will benefit from skilled PT services that includes manual therapy techniques to enhance tissue extensibility, neuromuscular re-education to facilitate motor control, therapeutic exercise to increase functional mobility, and modalities prn to reduce pain and inflammation.       Impairments: abnormal gait, abnormal muscle firing, abnormal muscle tone, abnormal or restricted ROM, abnormal movement, activity intolerance, impaired balance, impaired physical strength, lacks appropriate home exercise program, pain with function and weight-bearing intolerance    Symptom irritability: highUnderstanding of Dx/Px/POC: good   Prognosis: good  Prognosis details:       Goals  Impairment based goals  Patient will achieve 100 deg of hip flexion PROM. Patient will achieve >4/5 LE MMT. Patient will demonstrate 10s of R SLS balance. Function based goals  Patient will be independent in comprehensive HEP upon discharge. Patient will achieve goal FOTO score upon discharge. Patient will feel comfortable with her pre op preparation for FERNANDO. Patient will perform daily tasks and community ambulation without limitation. Plan  Patient would benefit from: skilled physical therapy  Planned therapy interventions: activity modification, joint mobilization, manual therapy, motor coordination training, neuromuscular re-education, patient education, self care, therapeutic activities, therapeutic exercise, graded activity, home exercise program, behavior modification, graded exercise, functional ROM exercises and strengthening  Frequency: 2x week  Duration in weeks: 8  Treatment plan discussed with: patient        Subjective Evaluation    History of Present Illness  Mechanism of injury: Patient presents with chronic R hip pain. She has had x ray imaging done which shows significant degenerative joint changes. She is planning on having a joint replacement surgery performed; but has not yet planned when. She is looking to improve strength to prepare for surgery. She had a severe episode of back pain 6 years ago that has been something that limited her function. She started to notice having more difficulty with transfers and challenging positions due to weakness in the R leg. She wants to improve functional capacity of the R hip and to prepare for surgery.    Patient Goals  Patient goals for therapy: increased strength, independence with ADLs/IADLs, return to sport/leisure activities, increased motion and decreased pain    Pain  Current pain ratin  At best pain ratin  At worst pain ratin  Quality: sharp and knife-like  Relieving factors: change in position  Aggravating factors: walking, stair climbing and lifting (floor transfers; tub transfers)          Objective     General Comments:      Hip Comments   Hip Passive Range of Motion:   Flexion: 90 deg; end range pain  Extension: 10 deg  Abduction: [facility]  ER @ 90 de deg  IR @ 90 deg: >5 deg     Clinical Tests:   SEGUNDO: reproduction of pain  SCOUR: reproduction of pain  FADIR: severely limited range; reproduction of pain    Palpation: TTP R lateral hip    Gait: L SPC; R antalgic gait     Lower Quarter Screen  Dermatomes: intact to light touch  Myotomes: intact throughout    Manual Muscle Testing:   Hip Flexion (SLR): R  3-/5    Hip Extension: R  3-/5   Hip Abduction: R  3-/5  Hip IR: R  3-/5  L /5   Hip ER: R  3-/5    Knee Extension: R  4/5    Knee Flexion:  R  4/5      SLS balance: >5s R                     Precautions: none      Manuals                                             Neuro Re-Ed         Hooklying PBall crush         SLR         Sidelying hip abd         Sidelying clamshell HEP        Balance         Hip ROM motor control          POFS unilateral row          Palloff press w/ side steps                                    Education  HEP and POC        Ther Ex         Nu-step         Bridge HEP        REIL HEP        LTR HEP        Leg press         Hip flexor stretch                           Ther Activity         FSU         STS         Deadlift                  Gait Training                           Modalities

## 2023-07-25 NOTE — LETTER
2023    Dariela Enriquez MD  [address] [facility] [place] [address]    Patient: Kendy Saldana   YOB: 1950   Date of Visit: 2023     Encounter Diagnosis     ICD-10-CM    1. Chronic right hip pain  M25.551     G89.29           Dear Dr. Sruthi Mccormick: Thank you for your recent referral of Kendy Saldana. Please review the attached evaluation summary from Marleny's recent visit. Please verify that you agree with the plan of care by signing the attached order. If you have any questions or concerns, please do not hesitate to call. I sincerely appreciate the opportunity to share in the care of one of your patients and hope to have another opportunity to work with you in the near future. Sincerely,    Loree Smith, PT      Referring Provider:      I certify that I have read the below Plan of Care and certify the need for these services furnished under this plan of treatment while under my care. Dariela Enriquez MD  [facility]  Via Fax: [phone]          PT Evaluation     Today's date: 2023  Patient name: Kendy Saldana  : 1950  MRN: [id number]  Referring provider: Dalila Buchanan DO  Dx:   Encounter Diagnosis     ICD-10-CM    1. Chronic right hip pain  M25.551     G89.29                      Assessment  Assessment details: Kendy Saldana is a pleasant 67 y.o. female who presents with chronic R hip pain. The patient has been struggling with R hip pain for approx 6 years. She had a severe episode of low back pain at the same time; which combined to cause significant issue with her functional capacity. She has been doing progressively less in the past few years and feels like her weakness and lack of function are going to make the rehab process after a FERNANDO difficult. She wants to improve strength and functional capacity prior to surgery.  No further referral appears necessary at this time based upon examination results. Primary movement impairment diagnosis of R hip weakness, R hip hypomobility, balance deficit. Upon physical exam, patient demonstrates compensation to limit RLE weight bearing during gait and STS transfers. R hip flexion and IR mobility deficits limit her ability to perform bed mobility and STS transfers, as well. Patients symptoms appears to be most related to joint degenerative changes, but also likely has a soft tissue component related to tendinopathy and weakness. I discussed these findings with the patient and how her treatment plan will address these. I discussed an initial HEP to address mobility and strength deficits. Pt. will benefit from skilled PT services that includes manual therapy techniques to enhance tissue extensibility, neuromuscular re-education to facilitate motor control, therapeutic exercise to increase functional mobility, and modalities prn to reduce pain and inflammation. Impairments: abnormal gait, abnormal muscle firing, abnormal muscle tone, abnormal or restricted ROM, abnormal movement, activity intolerance, impaired balance, impaired physical strength, lacks appropriate home exercise program, pain with function and weight-bearing intolerance    Symptom irritability: highUnderstanding of Dx/Px/POC: good   Prognosis: good  Prognosis details:       Goals  Impairment based goals  Patient will achieve 100 deg of hip flexion PROM. Patient will achieve >4/5 LE MMT. Patient will demonstrate 10s of R SLS balance. Function based goals  Patient will be independent in comprehensive HEP upon discharge. Patient will achieve goal FOTO score upon discharge. Patient will feel comfortable with her pre op preparation for FERNANDO. Patient will perform daily tasks and community ambulation without limitation.        Plan  Patient would benefit from: skilled physical therapy  Planned therapy interventions: activity modification, joint mobilization, manual therapy, motor coordination training, neuromuscular re-education, patient education, self care, therapeutic activities, therapeutic exercise, graded activity, home exercise program, behavior modification, graded exercise, functional ROM exercises and strengthening  Frequency: 2x week  Duration in weeks: 8  Treatment plan discussed with: patient        Subjective Evaluation    History of Present Illness  Mechanism of injury: Patient presents with chronic R hip pain. She has had x ray imaging done which shows significant degenerative joint changes. She is planning on having a joint replacement surgery performed; but has not yet planned when. She is looking to improve strength to prepare for surgery. She had a severe episode of back pain 6 years ago that has been something that limited her function. She started to notice having more difficulty with transfers and challenging positions due to weakness in the R leg. She wants to improve functional capacity of the R hip and to prepare for surgery.    Patient Goals  Patient goals for therapy: increased strength, independence with ADLs/IADLs, return to sport/leisure activities, increased motion and decreased pain    Pain  Current pain ratin  At best pain ratin  At worst pain ratin  Quality: sharp and knife-like  Relieving factors: change in position  Aggravating factors: walking, stair climbing and lifting (floor transfers; tub transfers)          Objective     General Comments:      Hip Comments   Hip Passive Range of Motion:   Flexion: 90 deg; end range pain  Extension: 10 deg  Abduction: [facility]  ER @ 90 de deg  IR @ 90 deg: >5 deg     Clinical Tests:   SEGUNDO: reproduction of pain  SCOUR: reproduction of pain  FADIR: severely limited range; reproduction of pain    Palpation: TTP R lateral hip    Gait: L SPC; R antalgic gait     Lower Quarter Screen  Dermatomes: intact to light touch  Myotomes: intact throughout    Manual Muscle Testing:   Hip Flexion (SLR): R  3-/5    Hip Extension: R 3-/5   Hip Abduction: R  3-/5  Hip IR: R  3-/5  L /5   Hip ER: R  3-/5    Knee Extension: R  4/5    Knee Flexion:  R  4/5      SLS balance: >5s R                    Precautions: none      Manuals 7//25                                            Neuro Re-Ed         Hooklying PBall crush         SLR         Sidelying hip abd         Sidelying clamshell HEP        Balance         Hip ROM motor control          POFS unilateral row          Palloff press w/ side steps                                    Education  HEP and POC        Ther Ex         Nu-step         Bridge HEP        REIL HEP        LTR HEP        Leg press         Hip flexor stretch                           Ther Activity         FSU         STS         Deadlift                  Gait Training                           Modalities

## 2023-08-01 ENCOUNTER — APPOINTMENT (OUTPATIENT)
Dept: PHYSICAL THERAPY | Facility: REHABILITATION | Age: 73
End: 2023-08-01
Payer: MEDICARE

## 2023-08-03 ENCOUNTER — APPOINTMENT (OUTPATIENT)
Dept: PHYSICAL THERAPY | Facility: REHABILITATION | Age: 73
End: 2023-08-03
Payer: MEDICARE

## 2023-08-08 ENCOUNTER — APPOINTMENT (OUTPATIENT)
Dept: PHYSICAL THERAPY | Facility: REHABILITATION | Age: 73
End: 2023-08-08
Payer: MEDICARE

## 2023-08-10 ENCOUNTER — APPOINTMENT (OUTPATIENT)
Dept: PHYSICAL THERAPY | Facility: REHABILITATION | Age: 73
End: 2023-08-10
Payer: MEDICARE

## 2023-08-15 ENCOUNTER — OFFICE VISIT (OUTPATIENT)
Dept: PHYSICAL THERAPY | Facility: REHABILITATION | Age: 73
End: 2023-08-15
Payer: MEDICARE

## 2023-08-15 DIAGNOSIS — M25.551 CHRONIC RIGHT HIP PAIN: Primary | ICD-10-CM

## 2023-08-15 DIAGNOSIS — G89.29 CHRONIC RIGHT HIP PAIN: Primary | ICD-10-CM

## 2023-08-15 PROCEDURE — 97112 NEUROMUSCULAR REEDUCATION: CPT | Performed by: PHYSICAL THERAPIST

## 2023-08-15 NOTE — PROGRESS NOTES
Daily Note     Today's date: 8/15/2023  Patient name: Jean Carlos Rodriguez  : 1950  MRN: [id number]  Referring provider: Chandni Sarabia DO  Dx:   Encounter Diagnosis     ICD-10-CM    1. Chronic right hip pain  M25.551     G89.29                      Subjective: Patient has been struggling with hip pain with the prescribed HEP and also most daily activities. She is feeling discouraged by the pain and is afraid that she will not be able to participate in physical therapy due to the pain. She is frustrated by her lack of ability to perform exercises. She may be undergoing a cardiac catheterization in the coming weeks. Objective: See treatment diary below      Assessment: I spent a significant amount of time discussing the importance of a regular exercise program to maintain hip mobility and strength prior to joint replacement surgery. Patient understands this and does want to perform an exercise routine prior to surgery, but is fearful about the pain that it may cause. I was able to determine an updated HEP that does not increase the patient's hip pain. Patient will follow up in 1 week to determine her response. Plan: Continue per plan of care.       Precautions: none      Manuals 7//25 8/15                                           Neuro Re-Ed         Hooklying add set  20x5s; HEP       Hooklying marches  Unable; pain       Hooklying alt clamshell  20x ea; otb; HEP       Sidelying clamshell HEP        Balance         Hip ROM motor control          POFS unilateral row          Palloff press w/ side steps                                    Education  HEP and POC HEP updates       Ther Ex         Nu-step         Bridge HEP        REIL HEP        LTR HEP 20x ea; w/ add set; HEP       Leg press         Hip flexor stretch                           Ther Activity         FSU         STS         Deadlift                  Gait Training                           Modalities

## 2023-08-17 ENCOUNTER — APPOINTMENT (OUTPATIENT)
Dept: PHYSICAL THERAPY | Facility: REHABILITATION | Age: 73
End: 2023-08-17
Payer: MEDICARE

## 2023-08-22 ENCOUNTER — APPOINTMENT (OUTPATIENT)
Dept: PHYSICAL THERAPY | Facility: REHABILITATION | Age: 73
End: 2023-08-22
Payer: MEDICARE

## 2023-08-24 ENCOUNTER — APPOINTMENT (OUTPATIENT)
Dept: PHYSICAL THERAPY | Facility: REHABILITATION | Age: 73
End: 2023-08-24
Payer: MEDICARE

## 2023-08-29 ENCOUNTER — APPOINTMENT (OUTPATIENT)
Dept: PHYSICAL THERAPY | Facility: REHABILITATION | Age: 73
End: 2023-08-29
Payer: MEDICARE

## 2023-09-21 ENCOUNTER — OFFICE VISIT (OUTPATIENT)
Dept: PHYSICAL THERAPY | Facility: REHABILITATION | Age: 73
End: 2023-09-21
Payer: MEDICARE

## 2023-09-21 DIAGNOSIS — M25.551 CHRONIC RIGHT HIP PAIN: Primary | ICD-10-CM

## 2023-09-21 DIAGNOSIS — G89.29 CHRONIC RIGHT HIP PAIN: Primary | ICD-10-CM

## 2023-09-21 PROCEDURE — 97112 NEUROMUSCULAR REEDUCATION: CPT | Performed by: PHYSICAL THERAPIST

## 2023-09-21 PROCEDURE — 97164 PT RE-EVAL EST PLAN CARE: CPT | Performed by: PHYSICAL THERAPIST

## 2023-09-21 NOTE — PROGRESS NOTES
PT Re-Evaluation     Today's date: 2023  Patient name: Lexis Corcoran  : 1950  MRN: [id number]  Referring provider: Dre Spicer MD  Dx:   Encounter Diagnosis     ICD-10-CM    1. Chronic right hip pain  M25.551     G89.29                      Subjective: Patient has been experiencing more pain since her cardiac cath that was performed at [facility] on . She has not been able to perform her regular exercise program. She is frustrated with the level of pain and feels that ist is really limiting her ability to do her regular daily activities. She feels like she may need to start using Lofstrand crutches or a Rollater style walker due to her current level of pain and disability. Pain: 8/10      Objective: See treatment diary below    Hip Passive Range of Motion:   Flexion: 90 deg; end range pain  Extension: 10 deg  Abduction: [facility]  ER @ 90 de deg  IR @ 90 deg: >5 deg     Clinical Tests:   SEGUNDO: reproduction of pain  SCOUR: reproduction of pain  FADIR: severely limited range; reproduction of pain    Palpation: TTP R lateral hip    Gait: L SPC; R antalgic gait     Lower Quarter Screen  Dermatomes: intact to light touch  Myotomes: intact throughout    Manual Muscle Testing:   Hip Flexion (SLR): R  3-/5    Hip Extension: R  3-/5   Hip Abduction: R  3-/5  Hip IR: R  3-/5  L /5   Hip ER: R  3-/5    Knee Extension: R  4/5    Knee Flexion:  R  4/5      SLS balance: >5s R      Assessment: No significant changes in objective presentation is noted compared to the initial evaluation. Patient has been unable to attend physical therapy for the past month due to cardiac cath procedure that was performed. She has obtained clearance to return to physical therapy from cardiology. Patient is obviously distressed about her current condition, and level of hip pain. I encouraged patient that this is to be expected after her change in activity level and the procedure that was performed.  Patient responds well to interventions that were performed today, noting a significant improve in pain and ability to ambulate after tx. Patient expressed interest in using the Alter-G to help with gait, this will be attempted next visit. I discussed how using a more supportive assistive device will change her gait, weight bearing tolerance and impact strength in the long term. I recommended that she continue with the cane as able, as I believe that she will improve with physical therapy and will not require a walker or crutches. She is going to see an orthopedic surgeon in 2 weeks to discuss a hip replacement, which will likely be planned for early 2024. HEP updated as charted, assess her response and progress as appropriate next visit. Goals  Impairment based goals  Patient will achieve 100 deg of hip flexion PROM. Patient will achieve >4/5 LE MMT. Patient will demonstrate 10s of R SLS balance. Function based goals  Patient will be independent in comprehensive HEP upon discharge. Patient will achieve goal FOTO score upon discharge. Patient will feel comfortable with her pre op preparation for FERNANDO. Patient will perform daily tasks and community ambulation without limitation. Plan: 2x per week for 12 weeks.      Precautions: none      Manuals 7//25 8/15 9/21                                          Neuro Re-Ed         Hooklying add set  20x5s; HEP       Hooklying marches  Unable; pain 2x10 ea; HEP      Hooklying alt clamshell  20x ea; otb; HEP 2x10 ea; otb; HEP      Bridge w/ add set    2x10; HEP      Balance         Hip ROM motor control          POFS unilateral row          Palloff press w/ side steps                                    Education  HEP and POC HEP updates       Ther Ex         Nu-step         Bridge HEP        REIL HEP        LTR HEP 20x ea; w/ add set; HEP       Leg press         Hip flexor stretch                           Ther Activity         FSU         STS         Deadlift                  Gait Training         Alter-G   nv               Modalities

## 2023-09-21 NOTE — LETTER
2023    Dinesh Gastelum MD  [address] [place] [address]    Patient: Elizabeth Cali   YOB: 1950   Date of Visit: 2023     Encounter Diagnosis     ICD-10-CM    1. Chronic right hip pain  M25.551     G89.29           Dear Dr. Pam Preciado: Thank you for your recent referral of Elizabeth Cali. Please review the attached evaluation summary from Marleny's recent visit. Please verify that you agree with the plan of care by signing the attached order. If you have any questions or concerns, please do not hesitate to call. I sincerely appreciate the opportunity to share in the care of one of your patients and hope to have another opportunity to work with you in the near future. Sincerely,    Vera Rapp, PT      Referring Provider:      I certify that I have read the below Plan of Care and certify the need for these services furnished under this plan of treatment while under my care. Dinesh Gastelum MD  [facility]  Via Fax: [phone]          PT Re-Evaluation     Today's date: 2023  Patient name: Elizabeth Cali  : 1950  MRN: [id number]  Referring provider: Dinesh Gastelum MD  Dx:   Encounter Diagnosis     ICD-10-CM    1. Chronic right hip pain  M25.551     G89.29                      Subjective: Patient has been experiencing more pain since her cardiac cath that was performed at [facility] on . She has not been able to perform her regular exercise program. She is frustrated with the level of pain and feels that ist is really limiting her ability to do her regular daily activities. She feels like she may need to start using Lofstrand crutches or a Rollater style walker due to her current level of pain and disability.    Pain: 8/10      Objective: See treatment diary below    Hip Passive Range of Motion:   Flexion: 90 deg; end range pain  Extension: 10 deg  Abduction: [facility]  ER @ 90 de deg  IR @ 90 deg: >5 deg     Clinical Tests:   SEGUNDO: reproduction of pain  SCOUR: reproduction of pain  FADIR: severely limited range; reproduction of pain    Palpation: TTP R lateral hip    Gait: L SPC; R antalgic gait     Lower Quarter Screen  Dermatomes: intact to light touch  Myotomes: intact throughout    Manual Muscle Testing:   Hip Flexion (SLR): R  3-/5    Hip Extension: R  3-/5   Hip Abduction: R  3-/5  Hip IR: R  3-/5  L /5   Hip ER: R  3-/5    Knee Extension: R  4/5    Knee Flexion:  R  4/5      SLS balance: >5s R      Assessment: No significant changes in objective presentation is noted compared to the initial evaluation. Patient has been unable to attend physical therapy for the past month due to cardiac cath procedure that was performed. She has obtained clearance to return to physical therapy from cardiology. Patient is obviously distressed about her current condition, and level of hip pain. I encouraged patient that this is to be expected after her change in activity level and the procedure that was performed. Patient responds well to interventions that were performed today, noting a significant improve in pain and ability to ambulate after tx. Patient expressed interest in using the Alter-G to help with gait, this will be attempted next visit. I discussed how using a more supportive assistive device will change her gait, weight bearing tolerance and impact strength in the long term. I recommended that she continue with the cane as able, as I believe that she will improve with physical therapy and will not require a walker or crutches. She is going to see an orthopedic surgeon in 2 weeks to discuss a hip replacement, which will likely be planned for early 2024. HEP updated as charted, assess her response and progress as appropriate next visit. Goals  Impairment based goals  Patient will achieve 100 deg of hip flexion PROM. Patient will achieve >4/5 LE MMT. Patient will demonstrate 10s of R SLS balance. Function based goals  Patient will be independent in comprehensive HEP upon discharge. Patient will achieve goal FOTO score upon discharge. Patient will feel comfortable with her pre op preparation for FERNANDO. Patient will perform daily tasks and community ambulation without limitation. Plan: 2x per week for 12 weeks.     Precautions: none      Manuals 7//25 8/15 9/21                                          Neuro Re-Ed         Hooklying add set  20x5s; HEP       Hooklying marches  Unable; pain 2x10 ea; HEP      Hooklying alt clamshell  20x ea; otb; HEP 2x10 ea; otb; HEP      Bridge w/ add set    2x10; HEP      Balance         Hip ROM motor control          POFS unilateral row          Palloff press w/ side steps                                    Education  HEP and POC HEP updates       Ther Ex         Nu-step         Bridge HEP        REIL HEP        LTR HEP 20x ea; w/ add set; HEP       Leg press         Hip flexor stretch                           Ther Activity         FSU         STS         Deadlift                  Gait Training         Alter-G   nv               Modalities

## 2023-09-26 ENCOUNTER — APPOINTMENT (OUTPATIENT)
Dept: PHYSICAL THERAPY | Facility: REHABILITATION | Age: 73
End: 2023-09-26
Payer: MEDICARE

## 2023-09-28 ENCOUNTER — APPOINTMENT (OUTPATIENT)
Dept: PHYSICAL THERAPY | Facility: REHABILITATION | Age: 73
End: 2023-09-28
Payer: MEDICARE

## 2023-10-03 ENCOUNTER — OFFICE VISIT (OUTPATIENT)
Dept: PHYSICAL THERAPY | Facility: REHABILITATION | Age: 73
End: 2023-10-03
Payer: MEDICARE

## 2023-10-03 DIAGNOSIS — M25.551 CHRONIC RIGHT HIP PAIN: Primary | ICD-10-CM

## 2023-10-03 DIAGNOSIS — G89.29 CHRONIC RIGHT HIP PAIN: Primary | ICD-10-CM

## 2023-10-03 PROCEDURE — 97530 THERAPEUTIC ACTIVITIES: CPT | Performed by: PHYSICAL THERAPIST

## 2023-10-03 PROCEDURE — 97140 MANUAL THERAPY 1/> REGIONS: CPT | Performed by: PHYSICAL THERAPIST

## 2023-10-03 PROCEDURE — 97110 THERAPEUTIC EXERCISES: CPT | Performed by: PHYSICAL THERAPIST

## 2023-10-03 NOTE — PROGRESS NOTES
Daily Note     Today's date: 10/3/2023  Patient name: Gina Jacobson  : 1950  MRN: [id number]  Referring provider: Melodi Councilman, DO  Dx:   Encounter Diagnosis     ICD-10-CM    1. Chronic right hip pain  M25.551     G89.29                      Subjective: Patient has been dealing with severe R hip pain. She did some walking at an open house this weekend and has been on "bed rest" since due to the severity of hip pain. Objective: See treatment diary below      Assessment: Patient responds well to tx. Alter G treadmill is used for ambulation based on patient's pain during full weight bearing. Patient is able to ambulate with significant reduction in pain at the end of tx. I discussed normal physiologic response to tx and what to expect with her symptoms later today and tomorrow. Continue with Monster Sanches for gait training and attempt progression in LE strengthening volume as tolerated. Patient would benefit from continued PT. Plan: Continue per plan of care.       Precautions: none      Manuals 7//25 8/15 9/21 10/3     Hip PROM    TB                                Neuro Re-Ed         Hooklying add set  20x5s; HEP       Hooklying marches  Unable; pain 2x10 ea; HEP      Hooklying alt clamshell  20x ea; otb; HEP 2x10 ea; otb; HEP      Bridge w/ add set    2x10; HEP 3x10     Balance         Hip ROM motor control          POFS unilateral row          Palloff press w/ side steps                                    Education  HEP and POC HEP updates       Ther Ex         Nu-step         Bridge HEP        REIL HEP        LTR HEP 20x ea; w/ add set; HEP  10x ea; w/ add set; HEP     Leg press         Hip flexor stretch                           Ther Activity         FSU         STS         Deadlift                  Gait Training         Alter-G    20 min; @  0.9 mph  55% BW              Modalities

## 2023-10-05 ENCOUNTER — OFFICE VISIT (OUTPATIENT)
Dept: PHYSICAL THERAPY | Facility: REHABILITATION | Age: 73
End: 2023-10-05
Payer: MEDICARE

## 2023-10-05 DIAGNOSIS — G89.29 CHRONIC RIGHT HIP PAIN: Primary | ICD-10-CM

## 2023-10-05 DIAGNOSIS — M25.551 CHRONIC RIGHT HIP PAIN: Primary | ICD-10-CM

## 2023-10-05 PROCEDURE — 97116 GAIT TRAINING THERAPY: CPT | Performed by: PHYSICAL THERAPIST

## 2023-10-05 PROCEDURE — 97110 THERAPEUTIC EXERCISES: CPT | Performed by: PHYSICAL THERAPIST

## 2023-10-05 PROCEDURE — 97112 NEUROMUSCULAR REEDUCATION: CPT | Performed by: PHYSICAL THERAPIST

## 2023-10-05 NOTE — PROGRESS NOTES
Daily Note     Today's date: 10/5/2023  Patient name: Angel Luis Mckeon  : 1950  MRN: [id number]  Referring provider: Jennifer Kraft DO  Dx:   Encounter Diagnosis     ICD-10-CM    1. Chronic right hip pain  M25.551     G89.29                      Subjective: Patient reports fatigue and soreness after last tx. Objective: See treatment diary below      Assessment: Patient was experiencing more hip pain compared to last visit during [address] G walking, so BW support was altered to 50%, which good response to decrease pain. Patient reports an improvement in pain and weight bearing tolerance at the end of tx. Next visit progress to weight bearing exercises to address hip abd weakness, update HEP. Patient would benefit from continued PT. Plan: Continue per plan of care.       Precautions: none      Manuals 7//25 8/15 9/21 10/3 10/5    Hip PROM    TB                                Neuro Re-Ed         Hooklying add set  20x5s; HEP       Hooklying marches  Unable; pain 2x10 ea; HEP      Hooklying alt clamshell  20x ea; otb; HEP 2x10 ea; otb; HEP      Bridge w/ add set    2x10; HEP 3x10 3x10    Hooklying deadbug hip flexion ball crush     20x ea    Balance         Hip ROM motor control          POFS unilateral row          Palloff press w/ side steps                                    Education  HEP and POC HEP updates       Ther Ex         Nu-step         Bridge HEP        REIL HEP        LTR HEP 20x ea; w/ add set; HEP  10x ea; w/ add set; HEP     PBall hamstring roll     20x    Leg press         Hip flexor stretch                           Ther Activity         FSU         STS         Deadlift                  Gait Training         Alter-G    20 min; @  0.9 mph  55% BW 15 min: @ 0.9mph  50% BW             Modalities

## 2023-10-09 ENCOUNTER — APPOINTMENT (OUTPATIENT)
Dept: PHYSICAL THERAPY | Facility: REHABILITATION | Age: 73
End: 2023-10-09
Payer: MEDICARE

## 2023-10-10 ENCOUNTER — APPOINTMENT (OUTPATIENT)
Dept: PHYSICAL THERAPY | Facility: REHABILITATION | Age: 73
End: 2023-10-10
Payer: MEDICARE

## 2023-10-12 ENCOUNTER — APPOINTMENT (OUTPATIENT)
Dept: PHYSICAL THERAPY | Facility: REHABILITATION | Age: 73
End: 2023-10-12
Payer: MEDICARE

## 2023-10-17 ENCOUNTER — OFFICE VISIT (OUTPATIENT)
Dept: PHYSICAL THERAPY | Facility: REHABILITATION | Age: 73
End: 2023-10-17
Payer: MEDICARE

## 2023-10-17 DIAGNOSIS — G89.29 CHRONIC RIGHT HIP PAIN: Primary | ICD-10-CM

## 2023-10-17 DIAGNOSIS — M25.551 CHRONIC RIGHT HIP PAIN: Primary | ICD-10-CM

## 2023-10-17 PROCEDURE — 97530 THERAPEUTIC ACTIVITIES: CPT | Performed by: PHYSICAL THERAPIST

## 2023-10-17 PROCEDURE — 97110 THERAPEUTIC EXERCISES: CPT | Performed by: PHYSICAL THERAPIST

## 2023-10-17 PROCEDURE — 97112 NEUROMUSCULAR REEDUCATION: CPT | Performed by: PHYSICAL THERAPIST

## 2023-10-17 NOTE — PROGRESS NOTES
Daily Note     Today's date: 10/17/2023  Patient name: Andrea Darby  : 1950  MRN: [id number]  Referring provider: Marie Clemons DO  Dx:   Encounter Diagnosis     ICD-10-CM    1. Chronic right hip pain  M25.551     G89.29                        Subjective: Patient had a f/u with her doctor yesterday who stated she could not undergo surgery for the next 5 months due to a recent stent placement. The patient reported recent imaging showed "bone on bone and osteophytes" in the hip joint. The surgeon advised her to limit any weight-bearing which would further exacerbate her symptoms. The patient was upset at the bad news and the long wait until surgery. She is waiting for a call back from the doctor regarding any pain medication she is cleared to take by her cardiologist.       Objective: See treatment diary below      Assessment: Focused on hip abduction strengthening on the plinth and in standing today per primary PT's plan. Patient unable to initiate SL clamshell or SL hip abduction but was able to complete a full set after min to mod assist for first 5 repetitions from PT. Educated the patient on possible DOMS with new exercises given today. Updated HEP for home and walked patient through downloading [employer] iggy at her request.     Plan: Continue per plan of care. Assess response to new exercises NV. Patient also inquired into a hip cushion to use in her home to be discussed at next visit.       Precautions: none    [employer] HEP Access Code: [email or web address]      Manuals 7//25 8/15 9/21 10/3 10/5 10/17   Hip PROM    TB                                Neuro Re-Ed         Hooklying add set  20x5s; HEP       Hooklying marches  Unable; pain 2x10 ea; HEP      Hooklying alt clamshell  20x ea; otb; HEP 2x10 ea; otb; HEP      Bridge w/ add set    2x10; HEP 3x10 3x10 3x10   Hooklying deadbug hip flexion ball crush     20x ea 20x ea   Balance         Hip ROM motor control          POFS unilateral row          Palloff press w/ side steps                                    Education  HEP and POC HEP updates    HEP updates   Ther Ex         Nu-step         Bridge HEP        REIL HEP        LTR HEP 20x ea; w/ add set; HEP  10x ea; w/ add set; HEP     PBall hamstring roll     20x 10x   Leg press         Hip flexor stretch         SL clamshells      1x5 with min assist, 1x10 no assist   SL hip abduction      1x5 with min assist, 1x5 no assist   Seated/Stand Iso Hip Abduction      5"x10   Ther Activity         FSU         STS         Deadlift         Side Steps      1 lap   LSU      1x5   Gait Training         Alter-G    20 min; @  0.9 mph  55% BW 15 min: @ 0.9mph  50% BW             Modalities

## 2023-10-19 ENCOUNTER — OFFICE VISIT (OUTPATIENT)
Dept: PHYSICAL THERAPY | Facility: REHABILITATION | Age: 73
End: 2023-10-19
Payer: MEDICARE

## 2023-10-19 DIAGNOSIS — G89.29 CHRONIC RIGHT HIP PAIN: Primary | ICD-10-CM

## 2023-10-19 DIAGNOSIS — M25.551 CHRONIC RIGHT HIP PAIN: Primary | ICD-10-CM

## 2023-10-19 PROCEDURE — 97140 MANUAL THERAPY 1/> REGIONS: CPT | Performed by: PHYSICAL THERAPIST

## 2023-10-19 PROCEDURE — 97110 THERAPEUTIC EXERCISES: CPT | Performed by: PHYSICAL THERAPIST

## 2023-10-19 NOTE — PROGRESS NOTES
Daily Note     Today's date: 10/19/2023  Patient name: Lexis Corcoran  : 1950  MRN: [id number]  Referring provider: Blanche Orta DO  Dx:   Encounter Diagnosis     ICD-10-CM    1. Chronic right hip pain  M25.551     G89.29                        Subjective: Patient reports fair tolerance to last tx. She was encouraged by her ability to perform new exercises. She continues to struggle with walking and any weight bearing while at home due to hip pain. Objective: See treatment diary below      Assessment: Alter-G walking will be held for the time being based on symptom irritability level and patient preference. Patient tolerates table based exercises with some increase in hip pain, which is to be expected. I discussed normal response to exercise and expected levels of pain with certain exercises and positions. I encouraged patient to remain active, within her pain tolerance. Reintroduce weight bearing exercises as tolerated next visit. Patient would benefit from continued PT. Plan: Continue per plan of care.       Precautions: none    [employer] HEP Access Code: [email or web address]      Manuals 7//25 8/15 9/21 10/3 10/5 10/17 10/19   Hip PROM    TB   TB                                 Neuro Re-Ed          Hooklying add set  20x5s; HEP        Hooklying marches  Unable; pain 2x10 ea; HEP       Hooklying alt clamshell  20x ea; otb; HEP 2x10 ea; otb; HEP       Bridge w/ add set    2x10; HEP 3x10 3x10 3x10 3x10   Hooklying deadbug hip flexion ball crush     20x ea 20x ea 3x10 ea; 2s hold   Balance          Hip ROM motor control           POFS unilateral row           Palloff press w/ side steps                                        Education  HEP and POC HEP updates    HEP updates    Ther Ex          Nu-step          Bridge HEP         REIL HEP         LTR HEP 20x ea; w/ add set; HEP  10x ea; w/ add set; HEP      PBall hamstring roll     20x 10x 3x10   Leg press          Hip flexor stretch          SL clamshells 1x5 with min assist, 1x10 no assist 3x10; manual cueing   SL hip abduction      1x5 with min assist, 1x5 no assist 3x10   Seated/Stand Iso Hip Abduction      5"x10    Ther Activity          FSU          STS          Deadlift          Side Steps      1 lap    LSU      1x5    Gait Training          Alter-G    20 min; @  0.9 mph  55% BW 15 min: @ 0.9mph  50% BW               Modalities

## 2023-10-23 ENCOUNTER — APPOINTMENT (OUTPATIENT)
Dept: PHYSICAL THERAPY | Facility: REHABILITATION | Age: 73
End: 2023-10-23
Payer: MEDICARE

## 2023-10-24 ENCOUNTER — APPOINTMENT (OUTPATIENT)
Dept: PHYSICAL THERAPY | Facility: REHABILITATION | Age: 73
End: 2023-10-24
Payer: MEDICARE

## 2023-10-26 ENCOUNTER — OFFICE VISIT (OUTPATIENT)
Dept: PHYSICAL THERAPY | Facility: REHABILITATION | Age: 73
End: 2023-10-26
Payer: MEDICARE

## 2023-10-26 DIAGNOSIS — G89.29 CHRONIC RIGHT HIP PAIN: Primary | ICD-10-CM

## 2023-10-26 DIAGNOSIS — M25.551 CHRONIC RIGHT HIP PAIN: Primary | ICD-10-CM

## 2023-10-26 PROCEDURE — 97110 THERAPEUTIC EXERCISES: CPT | Performed by: PHYSICAL THERAPIST

## 2023-10-26 PROCEDURE — 97140 MANUAL THERAPY 1/> REGIONS: CPT | Performed by: PHYSICAL THERAPIST

## 2023-10-26 NOTE — PROGRESS NOTES
Daily Note     Today's date: 10/26/2023  Patient name: Clare Borrego  : 1950  MRN: [id number]  Referring provider: Rocky Ness DO  Dx:   Encounter Diagnosis     ICD-10-CM    1. Chronic right hip pain  M25.551     G89.29                        Subjective: Patient reports that she continues to struggle with pain on a daily basis. This limits her ability to perform even basic daily activities. Objective: See treatment diary below      Assessment: Intervention selection is modified today based on patient's pain level. Her tolerance for exercise is poor and has not progressed in recent visits. I believe this is primarily related to infrequent attendance of appointments and inconsistency with HEP performance. I encouraged patient to attempt to increase frequency of HEP performance. Next visit patient would like to discuss different options for [place] crutches to use instead of her [employer] walker, which she finds to be too cumbersome. Patient would benefit from continued PT. Plan: Continue per plan of care.       Precautions: none    [employer] HEP Access Code: [email or web address]      Manuals 8/15 9/21 10/3 10/5 10/17 10/19 10/26   Hip PROM   TB   TB TB                                 Neuro Re-Ed          Hooklying add set 20x5s; HEP         Hooklying marches Unable; pain 2x10 ea; HEP        Hooklying alt clamshell 20x ea; otb; HEP 2x10 ea; otb; HEP        Bridge w/ add set   2x10; HEP 3x10 3x10 3x10 3x10 2x10   Hooklying deadbug hip flexion ball crush    20x ea 20x ea 3x10 ea; 2s hold 5s ea; pain   Balance          Hip ROM motor control           POFS unilateral row           Palloff press w/ side steps                                        Education  HEP updates    HEP updates     Ther Ex          Nu-step          Bridge          REIL          LTR 20x ea; w/ add set; HEP  10x ea; w/ add set; HEP       PBall hamstring roll    20x 10x 3x10 2x10   Leg press          Hip flexor stretch          SL clamshells 1x5 with min assist, 1x10 no assist 3x10; manual cueing 2x10   SL hip abduction     1x5 with min assist, 1x5 no assist 3x10 2x10   Seated/Stand Iso Hip Abduction     5"x10     Ther Activity          FSU          STS          Deadlift          Side Steps     1 lap     LSU     1x5     Gait Training          Alter-G   20 min; @  0.9 mph  55% BW 15 min: @ 0.9mph  50% BW                Modalities

## 2023-10-30 ENCOUNTER — APPOINTMENT (OUTPATIENT)
Dept: PHYSICAL THERAPY | Facility: REHABILITATION | Age: 73
End: 2023-10-30
Payer: MEDICARE

## 2023-10-31 ENCOUNTER — APPOINTMENT (OUTPATIENT)
Dept: PHYSICAL THERAPY | Facility: REHABILITATION | Age: 73
End: 2023-10-31
Payer: MEDICARE

## 2023-11-02 ENCOUNTER — APPOINTMENT (OUTPATIENT)
Dept: PHYSICAL THERAPY | Facility: REHABILITATION | Age: 73
End: 2023-11-02
Payer: MEDICARE

## 2023-11-06 ENCOUNTER — OFFICE VISIT (OUTPATIENT)
Dept: PHYSICAL THERAPY | Facility: REHABILITATION | Age: 73
End: 2023-11-06
Payer: MEDICARE

## 2023-11-06 DIAGNOSIS — M25.551 CHRONIC RIGHT HIP PAIN: Primary | ICD-10-CM

## 2023-11-06 DIAGNOSIS — G89.29 CHRONIC RIGHT HIP PAIN: Primary | ICD-10-CM

## 2023-11-06 PROCEDURE — 97110 THERAPEUTIC EXERCISES: CPT | Performed by: PHYSICAL THERAPIST

## 2023-11-06 PROCEDURE — 97140 MANUAL THERAPY 1/> REGIONS: CPT | Performed by: PHYSICAL THERAPIST

## 2023-11-06 NOTE — PROGRESS NOTES
Daily Note     Today's date: 2023  Patient name: Makeda Wise  : 1950  MRN: [id number]  Referring provider: Thierno Pizarro DO  Dx:   Encounter Diagnosis     ICD-10-CM    1. Chronic right hip pain  M25.551     G89.29                        Subjective: Patient went to a house showing on Wednesday of last week. Since that time she was basically on bed rest due to hip pain. Objective: See treatment diary below      Assessment:  I believe that the patient's self restrictive behavior after a heavier day of activity is contributing to her continued loss of function. I strongly recommended that she still perform household walking, as a minimum, even on days that she does not feel good. I discussed how this self limiting behavior can cause her to continue to become weaker, and negatively impact her recover after surgery. This visit, I attempted progression in weight bearing interventions, which were poorly tolerated. Patient is significantly limited by pain and weakness with RLE weight bearing. Patient would benefit from continued PT. Plan: Continue per plan of care.       Precautions: none    [employer] HEP Access Code: [email or web address]      Manuals 10/3 10/5 10/17 10/19 10/26 11/6   Hip PROM TB   TB TB TB                              Neuro Re-Ed         Hooklying add set         Hooklying marches         Hooklying alt clamshell         Bridge w/ add set  3x10 3x10 3x10 3x10 2x10 3x10   Hooklying deadbug hip flexion ball crush  20x ea 20x ea 3x10 ea; 2s hold 5s ea; pain pain   Balance         Standing hip abd      3x10 ea; mod UE assist    Palloff press w/ side steps                                    Education    HEP updates      Ther Ex         Nu-step         Bridge         REIL         LTR 10x ea; w/ add set; HEP        PBall hamstring roll  20x 10x 3x10 2x10 2x10   Leg press         Hip flexor stretch         SL clamshells   1x5 with min assist, 1x10 no assist 3x10; manual cueing 2x10    SL hip abduction   1x5 with min assist, 1x5 no assist 3x10 2x10    Seated/Stand Iso Hip Abduction   5"x10      Ther Activity         FSU      pain   STS (w/ UE assist @ bar)      3x10; +2 airex  Rest  3x10; +2 airex     Deadlift         Side Steps   1 lap      LSU   1x5   5x; 4in; mod UE assist   Gait Training         Alter-G 20 min; @  0.9 mph  55% BW 15 min: @ 0.9mph  50% BW                Modalities

## 2023-11-09 ENCOUNTER — OFFICE VISIT (OUTPATIENT)
Dept: PHYSICAL THERAPY | Facility: REHABILITATION | Age: 73
End: 2023-11-09
Payer: MEDICARE

## 2023-11-09 DIAGNOSIS — G89.29 CHRONIC RIGHT HIP PAIN: Primary | ICD-10-CM

## 2023-11-09 DIAGNOSIS — M25.551 CHRONIC RIGHT HIP PAIN: Primary | ICD-10-CM

## 2023-11-09 PROCEDURE — 97140 MANUAL THERAPY 1/> REGIONS: CPT | Performed by: PHYSICAL THERAPIST

## 2023-11-09 NOTE — PROGRESS NOTES
Daily Note     Today's date: 2023  Patient name: Mulugeta Perea  : 1950  MRN: [id number]  Referring provider: Pippa Shields DO  Dx:   Encounter Diagnosis     ICD-10-CM    1. Chronic right hip pain  M25.551     G89.29                        Subjective: Patient had a fall yesterday, she did require assistance to get back up. She did not sustain injury during the fall. Her hip was more sore than usual last night. Objective: See treatment diary below      Assessment:  Patient's exercise tolerance is more limited this visit than previous visit, likely related to fall yesterday. Patient responds well to manual therapy, notes an improvement in pain at the end of tx. Patient would benefit from continued PT. Plan: Continue per plan of care.       Precautions: none    [employer] HEP Access Code: [email or web address]      Manuals 10/3 10/5 10/17 10/19 10/26 11/6 11/9   Hip PROM TB   TB TB TB TB                                 Neuro Re-Ed          Hooklying add set       20x5s   Hooklying marches          Hooklying alt clamshell          Bridge w/ add set  3x10 3x10 3x10 3x10 2x10 3x10 10x; pain   Hooklying deadbug hip flexion ball crush  20x ea 20x ea 3x10 ea; 2s hold 5s ea; pain pain    Balance          Standing hip abd      3x10 ea; mod UE assist     Palloff press w/ side steps          SAQ       2x10    3x5; + SLR                       Education    HEP updates       Ther Ex          Nu-step          Bridge          REIL          LTR 10x ea; w/ add set; HEP         PBall hamstring roll  20x 10x 3x10 2x10 2x10 5x; pain   SL clamshells   1x5 with min assist, 1x10 no assist 3x10; manual cueing 2x10     SL hip abduction   1x5 with min assist, 1x5 no assist 3x10 2x10     Seated/Stand Iso Hip Abduction   5"x10       Ther Activity          FSU      pain    STS (w/ UE assist @ bar)      3x10; +2 airex  Rest  3x10; +2 airex   4x5; w/ bar assist   Deadlift          Side Steps   1 lap       LSU   1x5   5x; 4in; mod UE assist 5x; pain   Gait Training          Alter-G 20 min; @  0.9 mph  55% BW 15 min: @ 0.9mph  50% BW                  Modalities

## 2023-11-13 ENCOUNTER — APPOINTMENT (OUTPATIENT)
Dept: PHYSICAL THERAPY | Facility: REHABILITATION | Age: 73
End: 2023-11-13
Payer: MEDICARE

## 2023-11-16 ENCOUNTER — APPOINTMENT (OUTPATIENT)
Dept: PHYSICAL THERAPY | Facility: REHABILITATION | Age: 73
End: 2023-11-16
Payer: MEDICARE

## 2023-11-20 ENCOUNTER — OFFICE VISIT (OUTPATIENT)
Dept: PHYSICAL THERAPY | Facility: REHABILITATION | Age: 73
End: 2023-11-20
Payer: MEDICARE

## 2023-11-20 DIAGNOSIS — G89.29 CHRONIC RIGHT HIP PAIN: Primary | ICD-10-CM

## 2023-11-20 DIAGNOSIS — M25.551 CHRONIC RIGHT HIP PAIN: Primary | ICD-10-CM

## 2023-11-20 PROCEDURE — 97140 MANUAL THERAPY 1/> REGIONS: CPT | Performed by: PHYSICAL THERAPIST

## 2023-11-20 PROCEDURE — 97110 THERAPEUTIC EXERCISES: CPT | Performed by: PHYSICAL THERAPIST

## 2023-11-20 NOTE — PROGRESS NOTES
Daily Note     Today's date: 2023  Patient name: Katheryn Torre  : 1950  MRN: [id number]  Referring provider: Julissa Chavez DO  Dx:   Encounter Diagnosis     ICD-10-CM    1. Chronic right hip pain  M25.551     G89.29                        Subjective: Patient has continued to struggle with pain. It is severely limiting her activity level. She ran errands one day last week, and it caused her to require bed rest for 2 days. Objective: See treatment diary below      Assessment:  I stressed the importance of trying to avoid complete bed rest if at all possible. Patient tolerance for exercises continues to be poor. Exercises still need to be performed in regressed versions based on patient's level of pain. I encouraged patient to utilize home exercises that has been previously discussed as a way to help maintain joint mobility and strength. Patient would benefit from continued PT. Plan: Continue per plan of care.       Precautions: none    [employer] HEP Access Code: [email or web address]      Manuals 10/17 10/19 10/26 11/6 11/9 11/20   Hip PROM  TB TB TB TB TB                              Neuro Re-Ed         Hooklying add set     20x5s    Hooklying marches         Hooklying alt clamshell      3x10   Bridge w/ add set  3x10 3x10 2x10 3x10 10x; pain 4x5   Hooklying deadbug hip flexion ball crush 20x ea 3x10 ea; 2s hold 5s ea; pain pain     Balance         Standing hip abd    3x10 ea; mod UE assist      Palloff press w/ side steps         SAQ     2x10    3x5; + SLR 3x10; 3s                     Education  HEP updates        Ther Ex         Nu-step         Heel slide w/ active ext      3x10; btb   REIL         LTR         PBall hamstring roll 10x 3x10 2x10 2x10 5x; pain 3x10   SL clamshells 1x5 with min assist, 1x10 no assist 3x10; manual cueing 2x10      SL hip abduction 1x5 with min assist, 1x5 no assist 3x10 2x10      Seated/Stand Iso Hip Abduction 5"x10        Ther Activity         FSU    pain     STS (w/ UE assist @ bar)    3x10; +2 airex  Rest  3x10; +2 airex   4x5; w/ bar assist    Deadlift         Side Steps 1 lap        LSU 1x5   5x; 4in; mod UE assist 5x; pain    Gait Training         Alter-G                  Modalities

## 2023-11-27 ENCOUNTER — APPOINTMENT (OUTPATIENT)
Dept: PHYSICAL THERAPY | Facility: REHABILITATION | Age: 73
End: 2023-11-27
Payer: MEDICARE

## 2024-08-28 ENCOUNTER — PROCEDURE VISIT (OUTPATIENT)
Dept: FAMILY MEDICINE CLINIC | Facility: CLINIC | Age: 74
End: 2024-08-28
Payer: MEDICARE

## 2024-08-28 DIAGNOSIS — M99.03 SOMATIC DYSFUNCTION OF LUMBAR REGION: ICD-10-CM

## 2024-08-28 DIAGNOSIS — M99.05 SOMATIC DYSFUNCTION OF PELVIS REGION: ICD-10-CM

## 2024-08-28 DIAGNOSIS — M99.04 SOMATIC DYSFUNCTION OF SACRAL REGION: ICD-10-CM

## 2024-08-28 DIAGNOSIS — M25.551 RIGHT HIP PAIN: Primary | ICD-10-CM

## 2024-08-28 DIAGNOSIS — M99.02 SOMATIC DYSFUNCTION OF THORACIC REGION: ICD-10-CM

## 2024-08-28 DIAGNOSIS — M99.06 SOMATIC DYSFUNCTION OF LOWER EXTREMITY: ICD-10-CM

## 2024-08-28 PROCEDURE — G2211 COMPLEX E/M VISIT ADD ON: HCPCS

## 2024-08-28 PROCEDURE — 99213 OFFICE O/P EST LOW 20 MIN: CPT

## 2024-08-28 NOTE — PROGRESS NOTES
The Assessment & Plan     This is a 73 y.o. female who presents for OMT follow-up for:  1. Right hip pain        2. Somatic dysfunction of lumbar region        3. Somatic dysfunction of sacral region        4. Somatic dysfunction of pelvis region        5. Somatic dysfunction of lower extremity        6. Somatic dysfunction of thoracic region             1. Patient tolerated OMT well for the above problems,  advised patient to drink fluids and can use NSAID for soreness after treatment     2. OMT Follow up  New patient  .    Selvin Martinez is a 73 y.o. female and is here for a OMT initial visit. The patient reports low back pain and spasm for months since her right hip replacement approx 6-9 months ago. Patient referred by her friend who does massage therapy and is a patient of mine. She did not undergo any therapy after her hip replacement.  Due to this patient has felt very stiff and occasionally has difficulty walking due to this discomfort and pain.  Patient denies any weakness or numbness and tingling down the leg.  Patient denies any saddle anesthesia or bowel or bladder incontinence.  Patient feels as though she has tightness and spasm in her lower lumbar spine she also has very limited range of motion of that right hip.     Patient would like to trial OMT theray to improve ROM and enhance quality of life.     Is the patient taking Pain medication? no  Has the patient completed physical therapy for this condition? no  Did Patient symptoms improve from last OMT appointment?  This is patient initial evaluation     The following portions of the patient's history were reviewed and updated as appropriate: allergies, current medications, past family history, past medical history, past social history, past surgical history, and problem list.    Review of Systems  Review of Systems   Musculoskeletal:  Positive for arthralgias and back pain. Negative for neck pain.   Neurological:  Negative for  headaches.         Objective     OMT Exam     OMT    Performed by: Braxton Rucker DO  Authorized by: Braxton Rucker DO  Universal Protocol:  Procedure performed by: (Dr. Bernard)  Consent: Verbal consent obtained.  Consent given by: patient  Patient understanding: patient states understanding of the procedure being performed      Procedure Details:     Region evaluated and treated:  Lumbar, Sacrum/Pelvis, Pelvis Innominate, Right Extremities, Left Extremities and Thoracic    Extremity Information  Extremities: left lower extremity    Extremity Information  Extremities: right lower extremity    Thoracic Information  Thoracic Region: T10 - T12  Thoracic T10 - T12 details:     Examination Method:  Tissue Texture Change, Stability, Laxity, Effusions, Tone and Asymmetry, Misalignment, Crepitation, Defects, Masses    Severity:  Moderate    Osteopathic Findings:  Spasm of right quadratus lumborum T10-T12 neutral side bent left rotated right    Treatment Method:  Counterstrain Treatment, Facilitated Positional Release Treatment, Myofascial Release Treatment, Muscle Energy Treatment and Soft Tissue Treatment    Response:  Improved - The somatic dysfunction is improved but not completely resolved.    Lumbar details:     Examination Method:  Tissue Texture Change, Stability, Laxity, Effusions, Tone and Asymmetry, Misalignment, Crepitation, Defects, Masses    Severity:  Moderate    Osteopathic Findings:  Paraspinal hypertonicity more prominent on patient's right.  L1-L5 neutral side bent left rotated right L3 flexed side bent right rotated right    Treatment Method:  Soft Tissue Treatment, Myofascial Release Treatment, Muscle Energy Treatment, Facilitated Positional Release Treatment and Counterstrain Treatment    Response:  Improved - The somatic dysfunction is improved but not completely resolved.    Sacrum/Pelvis details:     Examination Method:  Asymmetry, Misalignment, Crepitation, Defects, Masses and Tissue Texture  Change, Stability, Laxity, Effusions, Tone    Osteopathic Findings:  Sacrum restricted extension    Treatment Method:  Articulatory Treatment    Response:  Improved - The somatic dysfunction is improved but not completely resolved.    Pelvis Innominate details:     Examination Method:  Tissue Texture Change, Stability, Laxity, Effusions, Tone and Asymmetry, Misalignment, Crepitation, Defects, Masses    Severity:  Moderate    Osteopathic Findings:  Right anterior innominate rotation right-sided piriformis tender point iliopsoas right-sided tender point    Treatment Method:  Soft Tissue Treatment, Myofascial Release Treatment, Muscle Energy Treatment and Counterstrain Treatment    Response:  Improved - The somatic dysfunction is improved but not completely resolved.    Right Lower Extremity details:     Examination Method:  Asymmetry, Misalignment, Crepitation, Defects, Masses and Tissue Texture Change, Stability, Laxity, Effusions, Tone    Severity:  Moderate    Osteopathic Findings:  Semimembranosus tender point bilaterally hypertonicity of IT band    Treatment Method:  Soft Tissue Treatment, Myofascial Release Treatment and Counterstrain Treatment    Response:  Improved - The somatic dysfunction is improved but not completely resolved.    Left Lower Extremity details:     Examination Method:  Asymmetry, Misalignment, Crepitation, Defects, Masses and Tissue Texture Change, Stability, Laxity, Effusions, Tone    Severity:  Moderate    Osteopathic Findings:  Semimembranosus tender point bilaterally hypertonicity of IT band      Treatment Method:  Soft Tissue Treatment, Myofascial Release Treatment and Counterstrain Treatment    Response:  Improved - The somatic dysfunction is improved but not completely resolved.    Total Regions Treated:  6  Attending provider present in exam room for procedure: Maryanne Rucker DO

## 2024-09-13 ENCOUNTER — PROCEDURE VISIT (OUTPATIENT)
Dept: FAMILY MEDICINE CLINIC | Facility: CLINIC | Age: 74
End: 2024-09-13

## 2024-09-13 DIAGNOSIS — M54.16 LUMBAR BACK PAIN WITH RADICULOPATHY AFFECTING RIGHT LOWER EXTREMITY: Primary | ICD-10-CM

## 2024-09-13 DIAGNOSIS — M99.02 SOMATIC DYSFUNCTION OF THORACIC REGION: ICD-10-CM

## 2024-09-13 DIAGNOSIS — M99.03 SOMATIC DYSFUNCTION OF LUMBAR REGION: ICD-10-CM

## 2024-09-13 DIAGNOSIS — M99.04 SOMATIC DYSFUNCTION OF SACRAL REGION: ICD-10-CM

## 2024-09-13 NOTE — PROGRESS NOTES
The Assessment & Plan     This is a 73 y.o. female who presents for OMT follow-up for:  1. Lumbar back pain with radiculopathy affecting right lower extremity        2. Somatic dysfunction of lumbar region        3. Somatic dysfunction of sacral region        4. Somatic dysfunction of thoracic region             1. Patient tolerated OMT well for the above problems,  advised patient to drink fluids and can use NSAID for soreness after treatment     2. OMT Follow up in 2 weeks.    Subjective     Marleny Martinez is a 73 y.o. female and is here for a OMT follow up. The patient reports improvement of symptoms since last OMT visit.  Patient was able to ambulate without much pain for 1 to 2 weeks after her last visit.  Although, pain returned approximately 1 week ago.  Patient states that she had spasms in her right lower back with occasional radicular symptoms going down her right leg.  Patient denies any bowel or bladder incontinence, weakness.  She describes it as a sharp stabbing pain that is worse when she bends forward and better with extension.  Patient states that Voltaren gel helps her pain but is not taking any other medications.  She is not undergoing physical therapy as she states it does not help.    Is the patient taking Pain medication? no  Has the patient completed physical therapy for this condition? no  Did Patient symptoms improve from last OMT appointment? yes    The following portions of the patient's history were reviewed and updated as appropriate: allergies, current medications, past family history, past medical history, past social history, past surgical history, and problem list.    Review of Systems  Review of Systems   Musculoskeletal:  Positive for back pain.   Neurological:  Negative for headaches.         Objective     OMT Exam     OMT    Performed by: Braxton Rucker DO  Authorized by: Braxton Rucker DO  Universal Protocol:  procedure performed by consultantConsent: Verbal consent  obtained.  Risks and benefits: risks, benefits and alternatives were discussed  Consent given by: patient  Patient understanding: patient states understanding of the procedure being performed  Patient identity confirmed: verbally with patient      Procedure Details:     Region evaluated and treated:  Thoracic, Lumbar and Sacrum/Pelvis    Thoracic Information  Thoracic Region: T10 - T12  Thoracic T10 - T12 details:     Examination Method:  Tissue Texture Change, Stability, Laxity, Effusions, Tone and Asymmetry, Misalignment, Crepitation, Defects, Masses    Severity:  Moderate    Osteopathic Findings:  Paraspinal hypertonicity more prominent on patient's right.  Spasm of right quadratus lumborum.  T10-T12 neutral side bent left rotated right.    Treatment Method:  Counterstrain Treatment, Facilitated Positional Release Treatment, High Velocity, Low Amplitude Treatment, Muscle Energy Treatment, Soft Tissue Treatment and Myofascial Release Treatment    Response:  Improved - The somatic dysfunction is improved but not completely resolved.    Lumbar details:     Examination Method:  Tissue Texture Change, Stability, Laxity, Effusions, Tone and Asymmetry, Misalignment, Crepitation, Defects, Masses    Severity:  Moderate    Osteopathic Findings:  Paraspinal hypertonicity more prominent on patient's right.  L1 L5 neutral side bent left rotated right    Treatment Method:  Counterstrain Treatment, Facilitated Positional Release Treatment, Muscle Energy Treatment, Myofascial Release Treatment and Soft Tissue Treatment    Response:  Improved - The somatic dysfunction is improved but not completely resolved.    Sacrum/Pelvis details:     Examination Method:  Asymmetry, Misalignment, Crepitation, Defects, Masses and Tissue Texture Change, Stability, Laxity, Effusions, Tone    Severity:  Moderate    Osteopathic Findings:  Unilateral sacral extension.  Positive back pending in spring.  Limited range of motion and sacral flexion     Treatment Method:  Articulatory Treatment, Facilitated Positional Release Treatment, High Velocity, Low Amplitude Treatment, Myofascial Release Treatment and Muscle Energy Treatment    Response:  Improved - The somatic dysfunction is improved but not completely resolved.    Total Regions Treated:  3  Attending provider present in exam room for procedure: Maryanne Rucker DO

## 2024-10-10 ENCOUNTER — TELEPHONE (OUTPATIENT)
Age: 74
End: 2024-10-10

## 2024-10-15 ENCOUNTER — PROCEDURE VISIT (OUTPATIENT)
Dept: FAMILY MEDICINE CLINIC | Facility: CLINIC | Age: 74
End: 2024-10-15

## 2024-10-15 DIAGNOSIS — M99.03 SOMATIC DYSFUNCTION OF LUMBAR REGION: ICD-10-CM

## 2024-10-15 DIAGNOSIS — M99.01 SOMATIC DYSFUNCTION OF CERVICAL REGION: ICD-10-CM

## 2024-10-15 DIAGNOSIS — M54.2 NECK PAIN: ICD-10-CM

## 2024-10-15 DIAGNOSIS — G89.29 CHRONIC BILATERAL LOW BACK PAIN WITHOUT SCIATICA: Primary | ICD-10-CM

## 2024-10-15 DIAGNOSIS — M54.50 CHRONIC BILATERAL LOW BACK PAIN WITHOUT SCIATICA: Primary | ICD-10-CM

## 2024-10-15 DIAGNOSIS — M99.04 SOMATIC DYSFUNCTION OF SACRAL REGION: ICD-10-CM

## 2024-10-15 DIAGNOSIS — M99.02 SOMATIC DYSFUNCTION OF THORACIC REGION: ICD-10-CM

## 2024-10-15 NOTE — PROGRESS NOTES
The Assessment & Plan     This is a 74 y.o. female who presents for OMT follow-up for:  1. Chronic bilateral low back pain without sciatica        2. Neck pain        3. Somatic dysfunction of cervical region        4. Somatic dysfunction of thoracic region        5. Somatic dysfunction of lumbar region        6. Somatic dysfunction of sacral region             1. Patient tolerated OMT well for the above problems,  advised patient to drink fluids and can use NSAID for soreness after treatment     2. OMT Follow up in 2 weeks.    Subjective     Marleny Martinez is a 74 y.o. female and is here for a OMT follow up. The patient reports neck pain and shoulder pain without any acute cause or injury.  Patient states that she is under a lot of stress dealing with other medical conditions and illnesses.  Patient denies any weakness, numbness or tingling or difficulty with  strength.  Patient notes a pressure-like bandlike pain under the back of her skull as well as tightness in her lower neck by her shoulder blade.  She has not tried anything to alleviate this.  She also notes chronic low back pain that waxes and wanes.  She denies any radicular symptoms, bowel or bladder incontinence or saddle anesthesias.  She states this has been ongoing since her hip replacement.  She states she has limited mobility due to this hip replacement.  She describes a constant pressure aching sensation in her lower back and buttocks.  She also points to an area discomfort around her PSIS on her right side.    Is the patient taking Pain medication? no  Has the patient completed physical therapy for this condition? yes  Did Patient symptoms improve from last OMT appointment? yes    The following portions of the patient's history were reviewed and updated as appropriate: allergies, current medications, past family history, past medical history, past social history, past surgical history, and problem list.    Review of Systems  Review of Systems    Musculoskeletal:  Positive for back pain and neck pain.   Neurological:  Positive for headaches.         Objective     OMT Exam     OMT    Performed by: Braxton Rucker DO  Authorized by: Braxton Rucker DO  Universal Protocol:  procedure performed by consultantConsent: Verbal consent obtained.  Consent given by: patient  Patient understanding: patient states understanding of the procedure being performed  Patient identity confirmed: verbally with patient      Procedure Details:     Region evaluated and treated:  Cervical, Thoracic, Sacrum/Pelvis and Lumbar    Thoracic Information  Thoracic Region: T10 - T12 and T5 - T9  Cervical Details:     Examination Method:  Tissue Texture Change, Stability, Laxity, Effusions, Tone and Asymmetry, Misalignment, Crepitation, Defects, Masses    Severity:  Moderate    Osteopathic Findings:  Paraspinal hypertonicity more prominent on patient's left.  Boggy suboccipital musculature.  Posterior C6 tender point on patient's left.  Posterior C4 tender point on patient's right.  Hypertonic scalenes bilaterally.    Left-sided trapezius hypertonicity.  Levator scapulae tender point on the left.    C7 extended side bent left rotated left    Treatment Method:  Soft Tissue Treatment, Myofascial Release Treatment, Muscle Energy Treatment, Facilitated Positional Release Treatment and Counterstrain Treatment    Response:  Improved - The somatic dysfunction is improved but not completely resolved.    Thoracic T5 - T9 details:     Examination Method:  Tissue Texture Change, Stability, Laxity, Effusions, Tone and Asymmetry, Misalignment, Crepitation, Defects, Masses    Severity:  Moderate    Osteopathic Findings:  Paraspinal hypertonicity more prominent on patient's right.  T5-T9 neutral side bent left rotated right.    Treatment Method:  Soft Tissue Treatment, Muscle Energy Treatment, High Velocity, Low Amplitude Treatment and Counterstrain Treatment    Response:  Improved - The somatic  dysfunction is improved but not completely resolved.    Thoracic T10 - T12 details:     Examination Method:  Asymmetry, Misalignment, Crepitation, Defects, Masses and Tissue Texture Change, Stability, Laxity, Effusions, Tone    Severity:  Moderate    Osteopathic Findings:  Quadratus lumborum spasm on patient's right.  T10-T12 neutral side bent left rotated right.  Paraspinal hypertonicity    Treatment Method:  Soft Tissue Treatment, Myofascial Release Treatment, Muscle Energy Treatment, Facilitated Positional Release Treatment, High Velocity, Low Amplitude Treatment and Counterstrain Treatment    Response:  Improved - The somatic dysfunction is improved but not completely resolved.    Lumbar details:     Examination Method:  Tissue Texture Change, Stability, Laxity, Effusions, Tone and Asymmetry, Misalignment, Crepitation, Defects, Masses    Severity:  Moderate    Osteopathic Findings:  L1-L5 neutral side bent  right rotated left.  Ropey paraspinal hypertonicity and spasm    Treatment Method:  Myofascial Release Treatment, Muscle Energy Treatment, Facilitated Positional Release Treatment, Counterstrain Treatment and Soft Tissue Treatment    Response:  Improved - The somatic dysfunction is improved but not completely resolved.    Sacrum/Pelvis details:     Examination Method:  Tissue Texture Change, Stability, Laxity, Effusions, Tone and Asymmetry, Misalignment, Crepitation, Defects, Masses    Severity:  Moderate    Osteopathic Findings:  Positive back pending.  Sacrum stuck in extension.    Treatment Method:  Articulatory Treatment and Soft Tissue Treatment    Response:  Improved - The somatic dysfunction is improved but not completely resolved.    Total Regions Treated:  4  Attending provider present in exam room for procedure: Maryanne Rucker, DO  PGY-2 Family Medicine

## 2024-10-28 ENCOUNTER — PROCEDURE VISIT (OUTPATIENT)
Dept: FAMILY MEDICINE CLINIC | Facility: CLINIC | Age: 74
End: 2024-10-28
Payer: MEDICARE

## 2024-10-28 DIAGNOSIS — M79.18 LUMBAR MUSCLE PAIN: Primary | ICD-10-CM

## 2024-10-28 DIAGNOSIS — M99.03 SOMATIC DYSFUNCTION OF LUMBAR REGION: ICD-10-CM

## 2024-10-28 DIAGNOSIS — M99.01 SOMATIC DYSFUNCTION OF CERVICAL REGION: ICD-10-CM

## 2024-10-28 DIAGNOSIS — M99.05 SOMATIC DYSFUNCTION OF PELVIS REGION: ICD-10-CM

## 2024-10-28 DIAGNOSIS — M25.512 ACUTE PAIN OF LEFT SHOULDER: ICD-10-CM

## 2024-10-28 DIAGNOSIS — M54.2 NECK PAIN: ICD-10-CM

## 2024-10-28 PROCEDURE — 98927 OSTEOPATH MANJ 5-6 REGIONS: CPT

## 2024-10-28 NOTE — PROGRESS NOTES
The Assessment & Plan     This is a 74 y.o. female who presents for OMT follow-up for:  1. Lumbar muscle pain        2. Neck pain        3. Somatic dysfunction of cervical region        4. Somatic dysfunction of lumbar region        5. Somatic dysfunction of pelvis region        6. Acute pain of left shoulder             1. Patient tolerated OMT well for the above problems,  advised patient to drink fluids and can use NSAID for soreness after treatment     2. OMT Follow up in 2 weeks.    Subjective     Marleny Martinez is a 74 y.o. female and is here for a OMT follow up. The patient reports continued pain in her cervical region, as well as lower back pain that is non-radiating. Patient uses only Voltaren gel for her cervical and lumbar pain, does not use any oral pain medication. Notes improvement with walking longer distances, and feels like she has to stop and rest less often.    Is the patient taking Pain medication? no  Has the patient completed physical therapy for this condition? no  Did Patient symptoms improve from last OMT appointment? yes    Reviewed hx of recent hip replacement and past OMT sessions.    Review of Systems  Review of Systems   Constitutional:  Negative for chills, fatigue and fever.   Respiratory:  Negative for choking, chest tightness and shortness of breath.    Cardiovascular:  Negative for chest pain and palpitations.   Gastrointestinal:  Negative for constipation and diarrhea.   Musculoskeletal:  Positive for back pain, myalgias, neck pain and neck stiffness.   Skin:  Negative for color change, rash and wound.   Neurological:  Negative for dizziness, tremors, syncope, weakness, light-headedness and headaches.         Objective     OMT Exam     OMT    Performed by: Violet Spicer DO  Authorized by: Violet Spicer DO  Universal Protocol:  Procedure performed by:  Consent: Verbal consent obtained.  Risks and benefits: risks, benefits and alternatives were discussed  Consent given by:  patient  Patient understanding: patient states understanding of the procedure being performed  Patient consent: the patient's understanding of the procedure matches consent given  Patient identity confirmed: verbally with patient      Procedure Details:     Region evaluated and treated:  Cervical, Pelvis Innominate, Right Extremities, Left Extremities and Lumbar    Extremity Information  Extremities: left lower extremity and left upper extremity    Extremity Information  Extremities: right lower extremity    Cervical Details:     Examination Method:  Tissue Texture Change, Stability, Laxity, Effusions, Tone, Asymmetry, Misalignment, Crepitation, Defects, Masses, Tenderness, Pain and Passive    Treatment Method:  Direct Treatment, Myofascial Release Treatment, Soft Tissue Treatment and Facilitated Positional Release Treatment    Response:  Improved - The somatic dysfunction is improved but not completely resolved.    Lumbar details:     Examination Method:  Tissue Texture Change, Stability, Laxity, Effusions, Tone, Asymmetry, Misalignment, Crepitation, Defects, Masses, Tenderness, Pain, Active and Passive    Severity:  Moderate    Treatment Method:  Counterstrain Treatment, Direct Treatment, Indirect Treatment, Muscle Energy Treatment, Myofascial Release Treatment and Soft Tissue Treatment    Response:  Improved - The somatic dysfunction is improved but not completely resolved.    Pelvis Innominate details:     Examination Method:  Asymmetry, Misalignment, Crepitation, Defects, Masses, Range of Motion, Contracture and Passive    Treatment Method:  Muscle Energy Treatment    Response:  Resolved - The somatic dysfunction is completely resolved without evidence of it ever having been present.    Right Lower Extremity details:     Examination Method:  Tissue Texture Change, Stability, Laxity, Effusions, Tone and Passive    Treatment Method:  Counterstrain Treatment and Indirect Treatment    Response:  Resolved - The  somatic dysfunction is completely resolved without evidence of it ever having been present.    Left Lower Extremity details:     Examination Method:  Tissue Texture Change, Stability, Laxity, Effusions, Tone, Tenderness, Pain and Passive    Treatment Method:  Counterstrain Treatment and Indirect Treatment    Response:  Resolved - The somatic dysfunction is completely resolved without evidence of it ever having been present.    Left Upper Extremity details:     Examination Method:  Tissue Texture Change, Stability, Laxity, Effusions, Tone, Asymmetry, Misalignment, Crepitation, Defects, Masses, Range of Motion, Contracture and Tenderness, Pain    Treatment Method:  Facilitated Positional Release Treatment    Response:  Improved - The somatic dysfunction is improved but not completely resolved.    Total Regions Treated:  5  Attending provider present in exam room for procedure: Yes

## 2024-11-13 ENCOUNTER — PROCEDURE VISIT (OUTPATIENT)
Dept: FAMILY MEDICINE CLINIC | Facility: CLINIC | Age: 74
End: 2024-11-13
Payer: MEDICARE

## 2024-11-13 DIAGNOSIS — M99.01 SOMATIC DYSFUNCTION OF CERVICAL REGION: ICD-10-CM

## 2024-11-13 DIAGNOSIS — M54.50 ACUTE BILATERAL LOW BACK PAIN WITHOUT SCIATICA: Primary | ICD-10-CM

## 2024-11-13 DIAGNOSIS — M99.08 SOMATIC DYSFUNCTION OF RIB CAGE REGION: ICD-10-CM

## 2024-11-13 DIAGNOSIS — M99.00 SOMATIC DYSFUNCTION OF HEAD REGION: ICD-10-CM

## 2024-11-13 DIAGNOSIS — M99.05 SOMATIC DYSFUNCTION OF PELVIC REGION: ICD-10-CM

## 2024-11-13 DIAGNOSIS — M99.02 SOMATIC DYSFUNCTION OF THORACIC REGION: ICD-10-CM

## 2024-11-13 DIAGNOSIS — G89.29 CHRONIC LEFT SHOULDER PAIN: ICD-10-CM

## 2024-11-13 DIAGNOSIS — M99.03 SOMATIC DYSFUNCTION OF LUMBAR REGION: ICD-10-CM

## 2024-11-13 DIAGNOSIS — M25.512 CHRONIC LEFT SHOULDER PAIN: ICD-10-CM

## 2024-11-13 PROCEDURE — 98927 OSTEOPATH MANJ 5-6 REGIONS: CPT | Performed by: FAMILY MEDICINE

## 2024-11-13 NOTE — PROGRESS NOTES
Assessment & Plan     This is a 74 y.o. female who presents for OMT follow-up for:  1. Acute bilateral low back pain without sciatica        2. Chronic left shoulder pain        3. Somatic dysfunction of pelvic region        4. Somatic dysfunction of head region        5. Somatic dysfunction of thoracic region        6. Somatic dysfunction of lumbar region        7. Somatic dysfunction of rib cage region        8. Somatic dysfunction of cervical region            Plan:   1. Patient tolerated OMT well for the above problems,  advised patient to drink fluids and can use NSAID for soreness after treatment     2. OMT Follow up in 2 weeks.    Subjective     Marleny Martinez is a 74 y.o. female and is here for a OMT follow up. The patient reports improvement since prior appt. Has chronic back pain x years. Hip replaced 8 months ago, and has had issues with gait 2/2 back and hip pain. Limited mobility 2/2 msk pain AND vertigo. Additionally L upper back/scapular pain 2/2 using left arm often for ADLs.     Is the patient taking Pain medication? no - only topical voltaren  Has the patient completed physical therapy for this condition? yes  Did Patient symptoms improve from last OMT appointment? yes    The following portions of the patient's history were reviewed and updated as appropriate: allergies, current medications, past family history, past medical history, past social history, past surgical history, and problem list.    Review of Systems  Do you have pain that bothers you in your daily life? yes  Review of Systems   Musculoskeletal:  Positive for back pain, gait problem and neck pain.   Neurological:  Positive for dizziness.     Objective     OMT Exam   OMT    Performed by: Rizwana Bal MD  Authorized by: Rizwana Bal MD  Universal Protocol:  Procedure performed by: (Dr. Vernon)  Consent: Verbal consent obtained.  Consent given by: patient  Patient understanding: patient states understanding of the  procedure being performed  Patient identity confirmed: verbally with patient      Procedure Details:     Region evaluated and treated:  Head, Cervical, Lumbar, Pelvis Innominate, Ribs and Thoracic    Thoracic Information  Thoracic Region: T5 - T9  Head Details:     Examination Method:  Tissue Texture Change, Stability, Laxity, Effusions, Tone    Severity:  Mild    Osteopathic Findings:  Occipital tenderness    Treatment Method:  Soft Tissue Treatment    Response:  Improved - The somatic dysfunction is improved but not completely resolved.    Cervical Details:     Examination Method:  Tissue Texture Change, Stability, Laxity, Effusions, Tone and Asymmetry, Misalignment, Crepitation, Defects, Masses    Severity:  Moderate    Osteopathic Findings:  L trap tenderness to palpation, hypertonicity compared to R    Treatment Method:  Counterstrain Treatment, Soft Tissue Treatment and Muscle Energy Treatment    Response:  Improved - The somatic dysfunction is improved but not completely resolved.    Thoracic T5 - T9 details:     Examination Method:  Tissue Texture Change, Stability, Laxity, Effusions, Tone, Asymmetry, Misalignment, Crepitation, Defects, Masses and Tenderness, Pain    Osteopathic Findings:  T2-T8 sRrR  Paraspinal hypertonicity    Treatment Method:  High Velocity, Low Amplitude Treatment, Soft Tissue Treatment and Muscle Energy Treatment    Response:  Improved - The somatic dysfunction is improved but not completely resolved.    Lumbar details:     Examination Method:  Tissue Texture Change, Stability, Laxity, Effusions, Tone, Asymmetry, Misalignment, Crepitation, Defects, Masses and Tenderness, Pain    Osteopathic Findings:  Paraspinal hypertonicity  L1-L4 rLsL    Treatment Method:  Soft Tissue Treatment and Counterstrain Treatment    Response:  Improved - The somatic dysfunction is improved but not completely resolved.    Pelvis Innominate details:     Examination Method:  Asymmetry, Misalignment,  Crepitation, Defects, Masses, Tenderness, Pain and Tissue Texture Change, Stability, Laxity, Effusions, Tone    Osteopathic Findings:  Trigger point along sup post iliac crest  L YONI TTP      Treatment Method:  Counterstrain Treatment, Soft Tissue Treatment and Myofascial Release Treatment    Response:  Improved - The somatic dysfunction is improved but not completely resolved.    Ribs details:     Examination Method:  Tissue Texture Change, Stability, Laxity, Effusions, Tone and Asymmetry, Misalignment, Crepitation, Defects, Masses    Osteopathic Findings:  L post rib displaced    Treatment Method:  High Velocity, Low Amplitude Treatment and Soft Tissue Treatment    Total Regions Treated:  6  Attending provider present in exam room for procedure: Yes    Rizwana Bal MD  PGY-2 [facility]

## 2024-11-27 ENCOUNTER — PROCEDURE VISIT (OUTPATIENT)
Dept: FAMILY MEDICINE CLINIC | Facility: CLINIC | Age: 74
End: 2024-11-27
Payer: MEDICARE

## 2024-11-27 DIAGNOSIS — G89.29 CHRONIC BILATERAL THORACIC BACK PAIN: ICD-10-CM

## 2024-11-27 DIAGNOSIS — M54.2 NECK PAIN: Primary | ICD-10-CM

## 2024-11-27 DIAGNOSIS — G89.29 CHRONIC MIDLINE LOW BACK PAIN WITHOUT SCIATICA: ICD-10-CM

## 2024-11-27 DIAGNOSIS — M99.01 SOMATIC DYSFUNCTION OF CERVICAL REGION: ICD-10-CM

## 2024-11-27 DIAGNOSIS — M99.03 SOMATIC DYSFUNCTION OF LUMBAR REGION: ICD-10-CM

## 2024-11-27 DIAGNOSIS — M54.6 CHRONIC BILATERAL THORACIC BACK PAIN: ICD-10-CM

## 2024-11-27 DIAGNOSIS — M99.07 SOMATIC DYSFUNCTION OF UPPER EXTREMITY: ICD-10-CM

## 2024-11-27 DIAGNOSIS — M54.50 CHRONIC MIDLINE LOW BACK PAIN WITHOUT SCIATICA: ICD-10-CM

## 2024-11-27 DIAGNOSIS — M99.02 SOMATIC DYSFUNCTION OF THORACIC REGION: ICD-10-CM

## 2024-11-27 DIAGNOSIS — M99.04 SOMATIC DYSFUNCTION OF SACRAL REGION: ICD-10-CM

## 2024-11-27 PROCEDURE — 98927 OSTEOPATH MANJ 5-6 REGIONS: CPT

## 2024-11-27 PROCEDURE — 99213 OFFICE O/P EST LOW 20 MIN: CPT

## 2024-11-27 NOTE — PROGRESS NOTES
The Assessment & Plan     This is a 74 y.o. female who presents for OMT follow-up for:  1. Neck pain        2. Chronic midline low back pain without sciatica        3. Chronic bilateral thoracic back pain        4. Somatic dysfunction of cervical region        5. Somatic dysfunction of thoracic region        6. Somatic dysfunction of lumbar region        7. Somatic dysfunction of sacral region        8. Somatic dysfunction of upper extremity               1. Patient tolerated OMT well for the above problems,  advised patient to drink fluids and can use NSAID for soreness after treatment     2. OMT Follow up in 2 weeks.    Subjective     Marleny Martinez is a 74 y.o. female and is here for a OMT follow up. The patient reports neck pain and shoulder pain without any acute cause or injury.   Patient denies any weakness, numbness or tingling or difficulty with  strength.  Patient notes a pressure-like bandlike pain and tightness in her lower neck by her left shoulder blade.  She also notes chronic low back pain that waxes and wanes.  She denies any radicular symptoms, bowel or bladder incontinence or saddle anesthesias.  She states this has been ongoing since her hip replacement.  She states she has limited mobility due to this hip replacement.  She describes a constant pressure aching sensation in her lower back and buttocks.  She also points to an area discomfort around her right lower thoracic spine.     Is the patient taking Pain medication? no  Has the patient completed physical therapy for this condition? yes  Did Patient symptoms improve from last OMT appointment? yes    The following portions of the patient's history were reviewed and updated as appropriate: allergies, current medications, past family history, past medical history, past social history, past surgical history, and problem list.    Review of Systems  Review of Systems   Musculoskeletal:  Positive for back pain and neck pain.   Neurological:   Negative for headaches.         Objective     OMT Exam     OMT    Performed by: Braxton Rucker DO  Authorized by: Braxton Rucker DO  Universal Protocol:  procedure performed by consultantConsent: Verbal consent obtained.  Consent given by: patient  Patient understanding: patient states understanding of the procedure being performed  Patient identity confirmed: verbally with patient      Procedure Details:     Region evaluated and treated:  Cervical, Thoracic, Sacrum/Pelvis, Lumbar and Left Extremities    Extremity Information  Extremities: left upper extremity    Thoracic Information  Thoracic Region: T10 - T12, T5 - T9 and T1 - T4  Cervical Details:     Examination Method:  Tissue Texture Change, Stability, Laxity, Effusions, Tone and Asymmetry, Misalignment, Crepitation, Defects, Masses    Severity:  Moderate    Osteopathic Findings:  Paraspinal hypertonicity more prominent on patient's right.  Boggy suboccipital musculature.  Posterior C6 tender point on patient's right .  Posterior C4 tender point on patient's right.  Hypertonic scalenes bilaterally.    Left-sided trapezius hypertonicity.  Levator scapulae tender point on the left.    C7 extended side bent right rotated right    Treatment Method:  Soft Tissue Treatment, Myofascial Release Treatment, Muscle Energy Treatment, Facilitated Positional Release Treatment and Counterstrain Treatment    Response:  Improved - The somatic dysfunction is improved but not completely resolved.    Thoracic T1 - T4 details:     Examination Method:  Tissue Texture Change, Stability, Laxity, Effusions, Tone and Asymmetry, Misalignment, Crepitation, Defects, Masses    Severity:  Moderate    Osteopathic Findings:  Paraspinal hypertonicity more prominent patient's left.  Posterior rib 6 on patient's left.  Hypertonic rhomboid on patient's left    Treatment Method:  Soft Tissue Treatment, Myofascial Release Treatment, Muscle Energy Treatment, Articulatory Treatment,  Counterstrain Treatment and Facilitated Positional Release Treatment    Response:  Improved    Thoracic T5 - T9 details:     Examination Method:  Tissue Texture Change, Stability, Laxity, Effusions, Tone and Asymmetry, Misalignment, Crepitation, Defects, Masses    Severity:  Moderate    Osteopathic Findings:  Paraspinal hypertonicity more prominent on patient's right.  T5-T9 neutral side bent left rotated right.    Treatment Method:  Soft Tissue Treatment, Muscle Energy Treatment, High Velocity, Low Amplitude Treatment and Counterstrain Treatment    Response:  Improved - The somatic dysfunction is improved but not completely resolved.    Thoracic T10 - T12 details:     Examination Method:  Asymmetry, Misalignment, Crepitation, Defects, Masses and Tissue Texture Change, Stability, Laxity, Effusions, Tone    Severity:  Moderate    Osteopathic Findings:  Quadratus lumborum spasm on patient's right.  T10-T12 neutral side bent left rotated right.  Paraspinal hypertonicity    Treatment Method:  Soft Tissue Treatment, Myofascial Release Treatment, Muscle Energy Treatment, Facilitated Positional Release Treatment, High Velocity, Low Amplitude Treatment and Counterstrain Treatment    Response:  Improved - The somatic dysfunction is improved but not completely resolved.    Lumbar details:     Examination Method:  Tissue Texture Change, Stability, Laxity, Effusions, Tone and Asymmetry, Misalignment, Crepitation, Defects, Masses    Severity:  Moderate    Osteopathic Findings:  L1-L5 neutral side bent  right rotated left.  Ropey paraspinal hypertonicity and spasm    Treatment Method:  Myofascial Release Treatment, Muscle Energy Treatment, Facilitated Positional Release Treatment, Counterstrain Treatment and Soft Tissue Treatment    Response:  Improved - The somatic dysfunction is improved but not completely resolved.    Sacrum/Pelvis details:     Examination Method:  Tissue Texture Change, Stability, Laxity, Effusions, Tone and  Asymmetry, Misalignment, Crepitation, Defects, Masses    Severity:  Moderate    Osteopathic Findings:  Positive back pending.  Sacrum stuck in extension.    Treatment Method:  Articulatory Treatment and Soft Tissue Treatment    Response:  Improved - The somatic dysfunction is improved but not completely resolved.    Left Upper Extremity details:     Examination Method:  Tissue Texture Change, Stability, Laxity, Effusions, Tone, Asymmetry, Misalignment, Crepitation, Defects, Masses and Range of Motion, Contracture    Severity:  Moderate    Osteopathic Findings:  Restricted rom and tenderness with movement     Provided jan tech    Treatment Method:  Articulatory Treatment    Response:  Improved - The somatic dysfunction is improved but not completely resolved.    Total Regions Treated:  5  Attending provider present in exam room for procedure: Yes    Braxton Rucker, DO  PGY-2 Family Medicine

## 2025-01-08 ENCOUNTER — PROCEDURE VISIT (OUTPATIENT)
Dept: FAMILY MEDICINE CLINIC | Facility: CLINIC | Age: 75
End: 2025-01-08
Payer: MEDICARE

## 2025-01-08 DIAGNOSIS — M99.07 SOMATIC DYSFUNCTION OF BOTH UPPER EXTREMITIES: ICD-10-CM

## 2025-01-08 DIAGNOSIS — G89.29 CHRONIC BILATERAL LOW BACK PAIN WITHOUT SCIATICA: Primary | ICD-10-CM

## 2025-01-08 DIAGNOSIS — M25.511 CHRONIC PAIN OF BOTH SHOULDERS: ICD-10-CM

## 2025-01-08 DIAGNOSIS — M99.01 SOMATIC DYSFUNCTION OF SPINE, CERVICAL: ICD-10-CM

## 2025-01-08 DIAGNOSIS — G89.29 CHRONIC PAIN OF BOTH SHOULDERS: ICD-10-CM

## 2025-01-08 DIAGNOSIS — M99.03 SOMATIC DYSFUNCTION OF SPINE, LUMBAR: ICD-10-CM

## 2025-01-08 DIAGNOSIS — M54.50 CHRONIC BILATERAL LOW BACK PAIN WITHOUT SCIATICA: Primary | ICD-10-CM

## 2025-01-08 DIAGNOSIS — M25.512 CHRONIC PAIN OF BOTH SHOULDERS: ICD-10-CM

## 2025-01-08 PROCEDURE — 98926 OSTEOPATH MANJ 3-4 REGIONS: CPT

## 2025-01-08 NOTE — PROGRESS NOTES
"The Assessment & Plan     This is a 74 y.o. female who presents for OMT follow-up for:  1. Chronic bilateral low back pain without sciatica        2. Chronic pain of both shoulders        3. Somatic dysfunction of both upper extremities        4. Somatic dysfunction of spine, cervical        5. Somatic dysfunction of spine, lumbar             1. Patient tolerated OMT well for the above problems,  advised patient to drink fluids and can use NSAID for soreness after treatment. Also recommended use of heat for muscle tightness as needed. Stretches for lumbar and cervical region reviewed.     2. OMT Follow up in 2 weeks.    3. May consider US of left shoulder to assess tendon involvement if patient does not demonstrate improvement of shoulder pain/ROM at next visit.     Subjective     Marleny Matrinez is a 74 y.o. female with a history of a right hip arthroplasty and is here for a OMT follow up. The patient reports that she fell getting out of the passenger side of her truck and fell onto her left side. She has been having left arm/shoulder and her lower back pain that is worsened since. She describes the pain as a \"tightness.\" No associated numbness or tingling radiating down legs. No incontinence or saddle anesthesia.    Is the patient taking Pain medication? no  Has the patient completed physical therapy for this condition? no  Did Patient symptoms improve from last OMT appointment? yes, but worsened after fall    The following portions of the patient's history were reviewed and updated as appropriate: allergies, current medications, past family history, past medical history, past social history, past surgical history, and problem list.    Review of Systems  Review of Systems   Constitutional:  Negative for fever.   Genitourinary:         No incontinence   Musculoskeletal:  Positive for arthralgias, back pain, myalgias and neck pain.        Limited ROM of upper extremities   Skin:  Negative for rash and wound. "   Neurological:  Negative for syncope and weakness.        No saddle anesthesia or paresthesias       Objective     OMT Exam     OMT    Performed by: Becca Renee DO  Authorized by: Becca Renee DO  Universal Protocol:  procedure performed by consultantConsent: Verbal consent not obtained.      Procedure Details:     Region evaluated and treated:  Cervical, Lumbar and Left Extremities    Extremity Information  Extremities: left upper extremity    Cervical Details:     Examination Method:  Tissue Texture Change, Stability, Laxity, Effusions, Tone and Asymmetry, Misalignment, Crepitation, Defects, Masses    Severity:  Mild    Osteopathic Findings:  Restriction at OA  Left paraspinal hypertonicity with associated trigger point  Right rib 1 elevation    Treatment Method:  Myofascial Release Treatment, Soft Tissue Treatment and Muscle Energy Treatment    Response:  Improved - The somatic dysfunction is improved but not completely resolved.    Lumbar details:     Examination Method:  Tissue Texture Change, Stability, Laxity, Effusions, Tone, Asymmetry, Misalignment, Crepitation, Defects, Masses and Tenderness, Pain    Severity:  Moderate    Osteopathic Findings:  Bilateral paraspinal hypertonicity (L>R) with trigger point along L1-2    Treatment Method:  Myofascial Release Treatment and Soft Tissue Treatment    Response:  Improved - The somatic dysfunction is improved but not completely resolved.    Left Upper Extremity details:     Examination Method:  Tissue Texture Change, Stability, Laxity, Effusions, Tone, Asymmetry, Misalignment, Crepitation, Defects, Masses and Tenderness, Pain    Severity:  Moderate    Osteopathic Findings:  Tenderness along anterior aspect of left shoulder  Decreased ROM of bilateral shoulders    Treatment Method:  Myofascial Release Treatment and Soft Tissue Treatment    Response:  Improved - The somatic dysfunction is improved but not completely resolved.    Total Regions  Treated:  3

## 2025-01-22 ENCOUNTER — TELEPHONE (OUTPATIENT)
Age: 75
End: 2025-01-22

## 2025-01-22 NOTE — TELEPHONE ENCOUNTER
Patient needs to reschedule today's OMT appt at 3pm due to her car battery dying.     Please call when able. Thank you!

## 2025-01-27 ENCOUNTER — TELEPHONE (OUTPATIENT)
Age: 75
End: 2025-01-27

## 2025-01-27 NOTE — TELEPHONE ENCOUNTER
Patient would like to reschedule OMT. Office unavailable to assist. Please advise back to patient.

## 2025-03-19 ENCOUNTER — PROCEDURE VISIT (OUTPATIENT)
Dept: FAMILY MEDICINE CLINIC | Facility: CLINIC | Age: 75
End: 2025-03-19
Payer: MEDICARE

## 2025-03-19 DIAGNOSIS — M54.50 CHRONIC BILATERAL LOW BACK PAIN WITHOUT SCIATICA: Primary | ICD-10-CM

## 2025-03-19 DIAGNOSIS — M99.03 SOMATIC DYSFUNCTION OF LUMBAR REGION: ICD-10-CM

## 2025-03-19 DIAGNOSIS — M99.01 SOMATIC DYSFUNCTION OF CERVICAL REGION: ICD-10-CM

## 2025-03-19 DIAGNOSIS — G89.29 CHRONIC BILATERAL LOW BACK PAIN WITHOUT SCIATICA: Primary | ICD-10-CM

## 2025-03-19 DIAGNOSIS — M99.04 SOMATIC DYSFUNCTION OF SACRAL REGION: ICD-10-CM

## 2025-03-19 DIAGNOSIS — M25.512 CHRONIC PAIN OF BOTH SHOULDERS: ICD-10-CM

## 2025-03-19 DIAGNOSIS — M25.511 CHRONIC PAIN OF BOTH SHOULDERS: ICD-10-CM

## 2025-03-19 DIAGNOSIS — G89.29 CHRONIC PAIN OF BOTH SHOULDERS: ICD-10-CM

## 2025-03-19 DIAGNOSIS — M99.07 SOMATIC DYSFUNCTION OF UPPER EXTREMITY: ICD-10-CM

## 2025-03-19 PROCEDURE — 98926 OSTEOPATH MANJ 3-4 REGIONS: CPT

## 2025-03-19 NOTE — PROGRESS NOTES
The Assessment & Plan     This is a 74 y.o. female who presents for OMT follow-up for:  No diagnosis found.     1. Patient tolerated OMT well for the above problems,  advised patient to drink fluids and can use NSAID for soreness after treatment     2. OMT Follow up in 3 weeks.    Subjective     Marleny Martinez is a 74 y.o. female and is here for a OMT follow up. The patient reports low back spasms, left shoulder decreased ROM and neck pain on the left. She is managing a number of other medical conditions as well. She goes for a catheterization next week.     Is the patient taking Pain medication? yes  Has the patient completed physical therapy for this condition? no  Did Patient symptoms improve from last OMT appointment? yes    The following portions of the patient's history were reviewed and updated as appropriate: allergies, current medications, past family history, past medical history, past social history, past surgical history, and problem list.    Review of Systems  Review of Systems   Musculoskeletal:  Positive for back pain and neck pain.         Objective     OMT Exam     OMT    Performed by: Ezio Myers DO  Authorized by: Ezio Myers DO  Universal Protocol:  procedure performed by consultantConsent: Verbal consent obtained.  Risks and benefits: risks, benefits and alternatives were discussed  Consent given by: patient      Procedure Details:     Region evaluated and treated:  Cervical, Left Extremities, Lumbar and Sacrum/Pelvis    Extremity Information  Extremities: left upper extremity    Cervical Details:     Examination Method:  Tissue Texture Change, Stability, Laxity, Effusions, Tone, Asymmetry, Misalignment, Crepitation, Defects, Masses, Tenderness, Pain and Range of Motion, Contracture    Severity:  Severe    Treatment Method:  Direct Treatment, Soft Tissue Treatment, Myofascial Release Treatment and Counterstrain Treatment    Response:  Improved - The somatic dysfunction is improved  but not completely resolved.    Lumbar details:     Examination Method:  Tissue Texture Change, Stability, Laxity, Effusions, Tone, Asymmetry, Misalignment, Crepitation, Defects, Masses and Tenderness, Pain    Severity:  Moderate    Treatment Method:  Soft Tissue Treatment, Myofascial Release Treatment and Direct Treatment    Response:  Improved - The somatic dysfunction is improved but not completely resolved.    Sacrum/Pelvis details:     Examination Method:  Asymmetry, Misalignment, Crepitation, Defects, Masses, Range of Motion, Contracture and Tenderness, Pain    Severity:  Mild    Treatment Method:  Direct Treatment and Articulatory Treatment    Response:  Improved - The somatic dysfunction is improved but not completely resolved.    Left Upper Extremity details:     Examination Method:  Tissue Texture Change, Stability, Laxity, Effusions, Tone, Asymmetry, Misalignment, Crepitation, Defects, Masses and Tenderness, Pain    Severity:  Moderate    Osteopathic Findings:  Decreased ROM - greatly improved with flexion, but still reduced on other planes of motion - can work on this in future visits    Treatment Method:  Soft Tissue Treatment, Myofascial Release Treatment, Muscle Energy Treatment, Direct Treatment and Counterstrain Treatment    Response:  Improved - The somatic dysfunction is improved but not completely resolved.    Total Regions Treated:  4  Attending provider present in exam room for procedure: Yes

## 2025-04-09 ENCOUNTER — PROCEDURE VISIT (OUTPATIENT)
Dept: FAMILY MEDICINE CLINIC | Facility: CLINIC | Age: 75
End: 2025-04-09
Payer: MEDICARE

## 2025-04-09 DIAGNOSIS — M99.00 SOMATIC DYSFUNCTION OF HEAD REGION: ICD-10-CM

## 2025-04-09 DIAGNOSIS — M25.512 CHRONIC PAIN OF BOTH SHOULDERS: ICD-10-CM

## 2025-04-09 DIAGNOSIS — M54.50 CHRONIC BILATERAL LOW BACK PAIN WITHOUT SCIATICA: Primary | ICD-10-CM

## 2025-04-09 DIAGNOSIS — M99.07 SOMATIC DYSFUNCTION OF BOTH UPPER EXTREMITIES: ICD-10-CM

## 2025-04-09 DIAGNOSIS — M99.03 SOMATIC DYSFUNCTION OF LUMBAR REGION: ICD-10-CM

## 2025-04-09 DIAGNOSIS — G89.29 CHRONIC PAIN OF BOTH SHOULDERS: ICD-10-CM

## 2025-04-09 DIAGNOSIS — G89.29 CHRONIC BILATERAL LOW BACK PAIN WITHOUT SCIATICA: Primary | ICD-10-CM

## 2025-04-09 DIAGNOSIS — M99.01 SOMATIC DYSFUNCTION OF CERVICAL REGION: ICD-10-CM

## 2025-04-09 DIAGNOSIS — M25.511 CHRONIC PAIN OF BOTH SHOULDERS: ICD-10-CM

## 2025-04-09 DIAGNOSIS — M99.04 SOMATIC DYSFUNCTION OF SACRAL REGION: ICD-10-CM

## 2025-04-09 PROCEDURE — 98927 OSTEOPATH MANJ 5-6 REGIONS: CPT

## 2025-04-09 NOTE — PROGRESS NOTES
The Assessment & Plan     This is a 74 y.o. female who presents for OMT follow-up for:  1. Chronic bilateral low back pain without sciatica        2. Chronic pain of both shoulders        3. Somatic dysfunction of sacral region        4. Somatic dysfunction of lumbar region        5. Somatic dysfunction of cervical region        6. Somatic dysfunction of both upper extremities        7. Somatic dysfunction of head region             1. Patient tolerated OMT well for the above problems,  advised patient to drink fluids and can use NSAID for soreness after treatment     2. OMT Follow up in 3 weeks.    Subjective     Marleny Martinez is a 74 y.o. female and is here for a OMT follow up. The patient reports low back pain and cramps worse than normal. ROM in left shoulder is also diminished.     Is the patient taking Pain medication? yes uses voltaren on back  Has the patient completed physical therapy for this condition? no  Did Patient symptoms improve from last OMT appointment? yes    She is seen at [facility] for a lot of her care, her medications aren't updated in our records, and did not have time to verify all doses here. She is currently on clopidogrel, zetia, listolic, riboflavin, coq10, cozaar, mounjaro, HCTZ, alirocumab, zoloft, wellbutrin, protonix, synthroid, emberon, and colchicine. Advise updating chart at next opportunity.    She goes for cardiac catheterization tomorrow.         Review of Systems  Review of Systems   Musculoskeletal:  Positive for back pain.   Neurological:  Negative for headaches.         Objective     OMT Exam     OMT    Performed by: Ezio Myers DO  Authorized by: Ezio Myers DO  Universal Protocol:  Consent: Verbal consent obtained.  Risks and benefits: risks, benefits and alternatives were discussed  Consent given by: patient      Procedure Details:     Region evaluated and treated:  Head, Lumbar, Left Extremities, Cervical, Right Extremities and Sacrum/Pelvis    Extremity  Information  Extremities: left upper extremity    Extremity Information  Extremities: right upper extremity    Head Details:     Examination Method:  Tissue Texture Change, Stability, Laxity, Effusions, Tone and Passive    Severity:  Mild    Treatment Method:  Direct Treatment, Myofascial Release Treatment and Soft Tissue Treatment    Response:  Improved - The somatic dysfunction is improved but not completely resolved.    Cervical Details:     Examination Method:  Tenderness, Pain, Asymmetry, Misalignment, Crepitation, Defects, Masses and Tissue Texture Change, Stability, Laxity, Effusions, Tone    Severity:  Moderate    Treatment Method:  Counterstrain Treatment, Myofascial Release Treatment, Soft Tissue Treatment and Direct Treatment    Response:  Improved - The somatic dysfunction is improved but not completely resolved.    Lumbar details:     Examination Method:  Tissue Texture Change, Stability, Laxity, Effusions, Tone, Tenderness, Pain and Asymmetry, Misalignment, Crepitation, Defects, Masses    Severity:  Moderate    Osteopathic Findings:  L2-4 NSLRR    Treatment Method:  Direct Treatment, Myofascial Release Treatment and Soft Tissue Treatment    Response:  Improved - The somatic dysfunction is improved but not completely resolved.    Sacrum/Pelvis details:     Examination Method:  Range of Motion, Contracture and Asymmetry, Misalignment, Crepitation, Defects, Masses    Severity:  Moderate    Osteopathic Findings:  R on R sacral torsion    Treatment Method:  Articulatory Treatment, Direct Treatment, Muscle Energy Treatment and Soft Tissue Treatment    Response:  Improved - The somatic dysfunction is improved but not completely resolved.    Right Upper Extremity details:     Examination Method:  Tissue Texture Change, Stability, Laxity, Effusions, Tone, Asymmetry, Misalignment, Crepitation, Defects, Masses, Range of Motion, Contracture, Tenderness, Pain and Passive    Severity:  Moderate    Osteopathic  Findings:  Veto technique for ROM    Treatment Method:  Muscle Energy Treatment, Soft Tissue Treatment, Myofascial Release Treatment and Direct Treatment    Response:  Improved - The somatic dysfunction is improved but not completely resolved.    Left Upper Extremity details:     Examination Method:  Tissue Texture Change, Stability, Laxity, Effusions, Tone, Asymmetry, Misalignment, Crepitation, Defects, Masses, Range of Motion, Contracture and Tenderness, Pain    Severity:  Moderate    Osteopathic Findings:  Keiko technique for ROM    Treatment Method:  Soft Tissue Treatment, Myofascial Release Treatment, Direct Treatment, Indirect Treatment and Muscle Energy Treatment    Response:  Improved - The somatic dysfunction is improved but not completely resolved.    Total Regions Treated:  6  Attending provider present in exam room for procedure: Yes

## 2025-05-07 ENCOUNTER — PROCEDURE VISIT (OUTPATIENT)
Dept: FAMILY MEDICINE CLINIC | Facility: CLINIC | Age: 75
End: 2025-05-07
Payer: MEDICARE

## 2025-05-07 DIAGNOSIS — M99.07 SOMATIC DYSFUNCTION OF UPPER EXTREMITY: ICD-10-CM

## 2025-05-07 DIAGNOSIS — M99.01 SOMATIC DYSFUNCTION OF CERVICAL REGION: ICD-10-CM

## 2025-05-07 DIAGNOSIS — M99.00 SOMATIC DYSFUNCTION OF HEAD REGION: ICD-10-CM

## 2025-05-07 DIAGNOSIS — M99.04 SOMATIC DYSFUNCTION OF SACRAL REGION: ICD-10-CM

## 2025-05-07 DIAGNOSIS — M54.50 CHRONIC BILATERAL LOW BACK PAIN WITHOUT SCIATICA: Primary | ICD-10-CM

## 2025-05-07 DIAGNOSIS — G89.29 CHRONIC BILATERAL LOW BACK PAIN WITHOUT SCIATICA: Primary | ICD-10-CM

## 2025-05-07 DIAGNOSIS — M99.03 SOMATIC DYSFUNCTION OF LUMBAR REGION: ICD-10-CM

## 2025-05-07 DIAGNOSIS — M99.02 SOMATIC DYSFUNCTION OF THORACIC REGION: ICD-10-CM

## 2025-05-07 PROCEDURE — 98928 OSTEOPATH MANJ 7-8 REGIONS: CPT

## 2025-05-07 NOTE — PROGRESS NOTES
The Assessment & Plan     This is a 74 y.o. female who presents for OMT follow-up for:  No diagnosis found.     1. Patient tolerated OMT well for the above problems,  advised patient to drink fluids and can use NSAID for soreness after treatment     2. OMT Follow up in 4 weeks.    Subjective     Marleny Martinez is a 74 y.o. female and is here for a OMT follow up. The patient reports low back pain, shoulder pain, and neck pain.     Is the patient taking Pain medication? yes  Has the patient completed physical therapy for this condition? no  Did Patient symptoms improve from last OMT appointment? yes        Review of Systems  Review of Systems   Musculoskeletal:  Positive for back pain.   Neurological:  Positive for headaches.         Objective     OMT Exam     OMT    Performed by: Ezio Myers DO  Authorized by: Ezio Myers DO    Universal Protocol:  Consent: Verbal consent obtained.  Consent given by: patient      Procedure Details:     Region evaluated and treated:  Head, Cervical, Lumbar, Sacrum/Pelvis, Thoracic, Left Extremities and Right Extremities    Extremity Information  Extremities: left upper extremity    Extremity Information  Extremities: right upper extremity    Thoracic Information  Thoracic Region: T10 - T12  Head Details:     Examination Method:  Tissue Texture Change, Stability, Laxity, Effusions, Tone    Severity:  Mild    Treatment Method:  Myofascial Release Treatment and Soft Tissue Treatment    Response:  Improved - The somatic dysfunction is improved but not completely resolved.    Cervical Details:     Examination Method:  Tissue Texture Change, Stability, Laxity, Effusions, Tone and Tenderness, Pain    Severity:  Moderate    Treatment Method:  Myofascial Release Treatment, Soft Tissue Treatment and Direct Treatment    Response:  Improved - The somatic dysfunction is improved but not completely resolved.    Thoracic T10 - T12 details:     Examination Method:  Tissue Texture  Change, Stability, Laxity, Effusions, Tone and Asymmetry, Misalignment, Crepitation, Defects, Masses    Severity:  Mild    Treatment Method:  Myofascial Release Treatment, Soft Tissue Treatment and Counterstrain Treatment    Response:  Improved - The somatic dysfunction is improved but not completely resolved.    Lumbar details:     Examination Method:  Tissue Texture Change, Stability, Laxity, Effusions, Tone, Tenderness, Pain and Asymmetry, Misalignment, Crepitation, Defects, Masses    Severity:  Moderate    Treatment Method:  Myofascial Release Treatment, Muscle Energy Treatment, Soft Tissue Treatment and Direct Treatment    Response:  Improved - The somatic dysfunction is improved but not completely resolved.    Sacrum/Pelvis details:     Examination Method:  Tissue Texture Change, Stability, Laxity, Effusions, Tone    Severity:  Mild    Treatment Method:  Balanced Ligamentous Tension, Ligamentous Articular Strain Treatment    Right Upper Extremity details:     Examination Method:  Asymmetry, Misalignment, Crepitation, Defects, Masses, Tenderness, Pain and Range of Motion, Contracture    Severity:  Moderate    Treatment Method:  Muscle Energy Treatment, Soft Tissue Treatment and Myofascial Release Treatment    Response:  Unchanged - The somatic dysfunction is unchanged or the same after treatment.    Left Upper Extremity details:     Examination Method:  Range of Motion, Contracture, Tenderness, Pain and Asymmetry, Misalignment, Crepitation, Defects, Masses    Severity:  Moderate    Treatment Method:  Myofascial Release Treatment, Muscle Energy Treatment and Soft Tissue Treatment    Response:  Improved - The somatic dysfunction is improved but not completely resolved.    Total Regions Treated:  7  Attending provider present in exam room for procedure: Yes          Physical Activity Assessment and Intervention:    Checked body fat percentage

## 2025-05-21 ENCOUNTER — PROCEDURE VISIT (OUTPATIENT)
Dept: FAMILY MEDICINE CLINIC | Facility: CLINIC | Age: 75
End: 2025-05-21
Payer: MEDICARE

## 2025-05-21 DIAGNOSIS — G89.29 CHRONIC PAIN IN LEFT SHOULDER: ICD-10-CM

## 2025-05-21 DIAGNOSIS — M99.07 SOMATIC DYSFUNCTION OF UPPER EXTREMITY: ICD-10-CM

## 2025-05-21 DIAGNOSIS — G89.29 CHRONIC BILATERAL LOW BACK PAIN WITHOUT SCIATICA: Primary | ICD-10-CM

## 2025-05-21 DIAGNOSIS — M99.05 SOMATIC DYSFUNCTION OF PELVIC REGION: ICD-10-CM

## 2025-05-21 DIAGNOSIS — M99.04 SOMATIC DYSFUNCTION OF SACRAL REGION: ICD-10-CM

## 2025-05-21 DIAGNOSIS — M25.512 CHRONIC PAIN IN LEFT SHOULDER: ICD-10-CM

## 2025-05-21 DIAGNOSIS — M99.03 SOMATIC DYSFUNCTION OF LUMBAR REGION: ICD-10-CM

## 2025-05-21 DIAGNOSIS — M54.50 CHRONIC BILATERAL LOW BACK PAIN WITHOUT SCIATICA: Primary | ICD-10-CM

## 2025-05-21 PROCEDURE — 98926 OSTEOPATH MANJ 3-4 REGIONS: CPT

## 2025-05-21 NOTE — PROGRESS NOTES
The Assessment & Plan     This is a 74 y.o. female who presents for OMT follow-up for:  1. Chronic bilateral low back pain without sciatica        2. Chronic pain in left shoulder        3. Somatic dysfunction of upper extremity        4. Somatic dysfunction of lumbar region        5. Somatic dysfunction of sacral region        6. Somatic dysfunction of pelvic region             1. Patient tolerated OMT well for the above problems,  advised patient to drink fluids and can use NSAID for soreness after treatment     2. OMT Follow up in 2 weeks.    Subjective     Marleny Martinez is a 74 y.o. female and is here for a OMT follow up. The patient reports ongoing bilateral lower back pain without sciatica. Gets worse with bending, sometimes has difficulty walking when pain is worse. Also having left shoulder pain from sleeping on that side.     Is the patient taking Pain medication? yes  Has the patient completed physical therapy for this condition? no  Did Patient symptoms improve from last OMT appointment? yes    The following portions of the patient's history were reviewed and updated as appropriate: allergies, current medications, past family history, past medical history, past social history, past surgical history, and problem list.    Review of Systems  Review of Systems   Constitutional:  Negative for chills and fever.   HENT:  Negative for ear pain and sore throat.    Eyes:  Negative for pain and visual disturbance.   Respiratory:  Negative for cough and shortness of breath.    Cardiovascular:  Negative for chest pain and palpitations.   Gastrointestinal:  Negative for abdominal pain and vomiting.   Genitourinary:  Negative for dysuria and hematuria.   Musculoskeletal:  Positive for back pain. Negative for arthralgias.        Left shoulder pain   Skin:  Negative for color change and rash.   Neurological:  Negative for seizures and syncope.   All other systems reviewed and are negative.        Objective     OMT Exam      OMT    Performed by: Kirstie Myers DO  Authorized by: Kirstie Myers DO    Universal Protocol:  Consent: Verbal consent obtained  Risks and benefits: risks, benefits and alternatives were discussed  Consent given by: patient  Patient understanding: patient states understanding of the procedure being performed  Patient consent: the patient's understanding of the procedure matches consent given  Procedure consent: procedure consent matches procedure scheduled  Patient identity confirmed: verbally with patient      Procedure Details:     Region evaluated and treated:  Sacrum/Pelvis, Pelvis Innominate, Lumbar and Left Extremities    Extremity Information  Extremities: left upper extremity    Lumbar details:     Examination Method:  Tissue Texture Change, Stability, Laxity, Effusions, Tone, Asymmetry, Misalignment, Crepitation, Defects, Masses and Tenderness, Pain    Severity:  Moderate    Osteopathic Findings:  L4 FRRSL, hypertonic paraspinals     Treatment Method:  Soft Tissue Treatment, Muscle Energy Treatment and Myofascial Release Treatment    Response:  Improved - The somatic dysfunction is improved but not completely resolved.    Sacrum/Pelvis details:     Examination Method:  Tissue Texture Change, Stability, Laxity, Effusions, Tone, Asymmetry, Misalignment, Crepitation, Defects, Masses and Tenderness, Pain    Severity:  Moderate    Osteopathic Findings:  Right unilateral sacral extension     Treatment Method:  Soft Tissue Treatment, Myofascial Release Treatment, Direct Treatment and High Velocity, Low Amplitude Treatment    Response:  Improved - The somatic dysfunction is improved but not completely resolved.    Pelvis Innominate details:     Examination Method:  Tissue Texture Change, Stability, Laxity, Effusions, Tone, Asymmetry, Misalignment, Crepitation, Defects, Masses and Tenderness, Pain    Severity:  Moderate    Osteopathic Findings:  Right anterior pelvic rotation     Treatment Method:   Soft Tissue Treatment, Myofascial Release Treatment and Muscle Energy Treatment    Response:  Improved - The somatic dysfunction is improved but not completely resolved.    Left Upper Extremity details:     Examination Method:  Tissue Texture Change, Stability, Laxity, Effusions, Tone, Asymmetry, Misalignment, Crepitation, Defects, Masses, Tenderness, Pain and Range of Motion, Contracture    Severity:  Moderate    Osteopathic Findings:  Reduced ROM around left shoulder joint, tender point bicep muscle    Treatment Method:  Articulatory Treatment, Soft Tissue Treatment and Myofascial Release Treatment    Response:  Improved - The somatic dysfunction is improved but not completely resolved.    Total Regions Treated:  4  Attending provider present in exam room for procedure: Yes

## 2025-06-04 ENCOUNTER — PROCEDURE VISIT (OUTPATIENT)
Dept: FAMILY MEDICINE CLINIC | Facility: CLINIC | Age: 75
End: 2025-06-04

## 2025-06-04 DIAGNOSIS — M54.50 CHRONIC BILATERAL LOW BACK PAIN WITHOUT SCIATICA: Primary | ICD-10-CM

## 2025-06-04 DIAGNOSIS — G89.29 CHRONIC BILATERAL LOW BACK PAIN WITHOUT SCIATICA: Primary | ICD-10-CM

## 2025-06-04 DIAGNOSIS — M99.03 SOMATIC DYSFUNCTION OF LUMBAR REGION: ICD-10-CM

## 2025-06-04 DIAGNOSIS — M99.04 SOMATIC DYSFUNCTION OF SACRAL REGION: ICD-10-CM

## 2025-06-04 DIAGNOSIS — M99.02 SOMATIC DYSFUNCTION OF THORACIC REGION: ICD-10-CM

## 2025-06-04 DIAGNOSIS — M25.512 CHRONIC PAIN IN LEFT SHOULDER: ICD-10-CM

## 2025-06-04 DIAGNOSIS — M99.01 SOMATIC DYSFUNCTION OF CERVICAL REGION: ICD-10-CM

## 2025-06-04 DIAGNOSIS — G89.29 CHRONIC PAIN IN LEFT SHOULDER: ICD-10-CM

## 2025-06-04 DIAGNOSIS — M99.07 SOMATIC DYSFUNCTION OF UPPER EXTREMITY: ICD-10-CM

## 2025-06-04 NOTE — PROGRESS NOTES
The Assessment & Plan     This is a 74 y.o. female who presents for OMT follow-up for:  1. Chronic bilateral low back pain without sciatica        2. Chronic pain in left shoulder        3. Somatic dysfunction of upper extremity        4. Somatic dysfunction of lumbar region        5. Somatic dysfunction of sacral region        6. Somatic dysfunction of cervical region        7. Somatic dysfunction of thoracic region             1. Patient tolerated OMT well for the above problems,  advised patient to drink fluids and can use NSAID for soreness after treatment     2. OMT Follow up 3 weeks    Subjective     Marleny Martinez is a 74 y.o. female and is here for a OMT follow up. The patient reports low back pain and left shoulder pain.    Is the patient taking Pain medication? yes  Has the patient completed physical therapy for this condition? yes  Did Patient symptoms improve from last OMT appointment? yes        Review of Systems  Review of Systems      Objective     OMT Exam     OMT    Performed by: Ezio Myers DO  Authorized by: Ezio Myers DO    Universal Protocol:  procedure performed by consultantConsent: Verbal consent obtained  Risks and benefits: risks, benefits and alternatives were discussed  Consent given by: patient      Procedure Details:     Region evaluated and treated:  Cervical, Lumbar, Left Extremities, Sacrum/Pelvis and Thoracic    Extremity Information  Extremities: left upper extremity    Thoracic Information  Thoracic Region: T5 - T9 and T10 - T12  Cervical Details:     Examination Method:  Tissue Texture Change, Stability, Laxity, Effusions, Tone    Severity:  Mild    Treatment Method:  Myofascial Release Treatment, Soft Tissue Treatment and Direct Treatment    Response:  Improved - The somatic dysfunction is improved but not completely resolved.    Thoracic T5 - T9 details:     Examination Method:  Tissue Texture Change, Stability, Laxity, Effusions, Tone, Asymmetry, Misalignment,  Crepitation, Defects, Masses and Tenderness, Pain    Severity:  Moderate    Treatment Method:  Direct Treatment, Muscle Energy Treatment, Myofascial Release Treatment and Soft Tissue Treatment    Response:  Improved - The somatic dysfunction is improved but not completely resolved.    Thoracic T10 - T12 details:     Examination Method:  Tissue Texture Change, Stability, Laxity, Effusions, Tone, Asymmetry, Misalignment, Crepitation, Defects, Masses and Tenderness, Pain    Severity:  Moderate    Treatment Method:  Direct Treatment, Myofascial Release Treatment and Soft Tissue Treatment    Response:  Improved - The somatic dysfunction is improved but not completely resolved.    Lumbar details:     Examination Method:  Asymmetry, Misalignment, Crepitation, Defects, Masses, Tissue Texture Change, Stability, Laxity, Effusions, Tone and Tenderness, Pain    Severity:  Moderate    Treatment Method:  Direct Treatment, Myofascial Release Treatment, Soft Tissue Treatment and Balanced Ligamentous Tension, Ligamentous Articular Strain Treatment    Response:  Improved - The somatic dysfunction is improved but not completely resolved.    Sacrum/Pelvis details:     Examination Method:  Asymmetry, Misalignment, Crepitation, Defects, Masses and Tenderness, Pain    Severity:  Moderate    Treatment Method:  Muscle Energy Treatment, Soft Tissue Treatment and Direct Treatment    Response:  Improved - The somatic dysfunction is improved but not completely resolved.    Left Upper Extremity details:     Examination Method:  Asymmetry, Misalignment, Crepitation, Defects, Masses, Tenderness, Pain and Range of Motion, Contracture    Severity:  Moderate    Treatment Method:  Muscle Energy Treatment, Myofascial Release Treatment, Soft Tissue Treatment and Indirect Treatment    Response:  Improved - The somatic dysfunction is improved but not completely resolved.    Total Regions Treated:  5  Attending provider present in exam room for procedure:  Yes

## 2025-06-25 ENCOUNTER — PROCEDURE VISIT (OUTPATIENT)
Dept: FAMILY MEDICINE CLINIC | Facility: CLINIC | Age: 75
End: 2025-06-25
Payer: MEDICARE

## 2025-06-25 DIAGNOSIS — M54.50 CHRONIC BILATERAL LOW BACK PAIN WITHOUT SCIATICA: Primary | ICD-10-CM

## 2025-06-25 DIAGNOSIS — M99.02 SOMATIC DYSFUNCTION OF THORACIC REGION: ICD-10-CM

## 2025-06-25 DIAGNOSIS — M99.04 SOMATIC DYSFUNCTION OF SACRAL REGION: ICD-10-CM

## 2025-06-25 DIAGNOSIS — G89.29 CHRONIC BILATERAL LOW BACK PAIN WITHOUT SCIATICA: Primary | ICD-10-CM

## 2025-06-25 DIAGNOSIS — M99.03 SOMATIC DYSFUNCTION OF LUMBAR REGION: ICD-10-CM

## 2025-06-25 DIAGNOSIS — M99.01 SOMATIC DYSFUNCTION OF CERVICAL REGION: ICD-10-CM

## 2025-06-25 DIAGNOSIS — M99.05 SOMATIC DYSFUNCTION OF PELVIC REGION: ICD-10-CM

## 2025-06-25 PROCEDURE — G2211 COMPLEX E/M VISIT ADD ON: HCPCS

## 2025-06-25 PROCEDURE — 99213 OFFICE O/P EST LOW 20 MIN: CPT

## 2025-06-25 NOTE — PROGRESS NOTES
The Assessment & Plan     This is a 74 y.o. female who presents for OMT follow-up for:  1. Chronic bilateral low back pain without sciatica  OMT      2. Somatic dysfunction of lumbar region  OMT      3. Somatic dysfunction of sacral region  OMT      4. Somatic dysfunction of pelvic region  OMT      5. Somatic dysfunction of cervical region  OMT      6. Somatic dysfunction of thoracic region  OMT           1. Patient tolerated OMT well for the above problems,  advised patient to drink fluids and can use NSAID for soreness after treatment     2. OMT Follow up in 3 weeks.    Subjective     Marleny Martinez is a 74 y.o. female and is here for a OMT follow up. The patient reports low back pain and neck pain. Less frequently. Sitting can help it go away. Left shoulder pain> right.     Is the patient taking Pain medication? yes  Has the patient completed physical therapy for this condition? yes  Did Patient symptoms improve from last OMT appointment? yes    The following portions of the patient's history were reviewed and updated as appropriate: allergies, current medications, past family history, past medical history, past social history, past surgical history, and problem list.    Review of Systems  Review of Systems   Musculoskeletal:  Positive for back pain and neck pain.         Objective     OMT Exam     OMT    Performed by: Valerie Andino DO  Authorized by: Valerie Andino DO    Universal Protocol:  procedure performed by consultantConsent: Verbal consent obtained  Risks and benefits: risks, benefits and alternatives were discussed  Consent given by: patient  Patient understanding: patient states understanding of the procedure being performed  Patient consent: the patient's understanding of the procedure matches consent given  Procedure consent: procedure consent matches procedure scheduled  Patient identity confirmed: verbally with patient      Procedure Details:     Region evaluated and treated:  Cervical,  Sacrum/Pelvis, Lumbar and Thoracic    Thoracic Information  Thoracic Region: T5 - T9  Cervical Details:     Examination Method:  Tissue Texture Change, Stability, Laxity, Effusions, Tone and Asymmetry, Misalignment, Crepitation, Defects, Masses    Severity:  Moderate    Treatment Method:  Soft Tissue Treatment, Myofascial Release Treatment, Muscle Energy Treatment, Indirect Treatment, Direct Treatment, Facilitated Positional Release Treatment and Articulatory Treatment    Response:  Improved - The somatic dysfunction is improved but not completely resolved.    Thoracic T5 - T9 details:     Examination Method:  Tissue Texture Change, Stability, Laxity, Effusions, Tone and Asymmetry, Misalignment, Crepitation, Defects, Masses    Severity:  Moderate    Treatment Method:  Soft Tissue Treatment, Myofascial Release Treatment, Integrated Neuromuscular Release, Direct Treatment and High Velocity, Low Amplitude Treatment    Response:  Improved - The somatic dysfunction is improved but not completely resolved.    Lumbar details:     Examination Method:  Tissue Texture Change, Stability, Laxity, Effusions, Tone and Asymmetry, Misalignment, Crepitation, Defects, Masses    Severity:  Moderate    Treatment Method:  Soft Tissue Treatment, Myofascial Release Treatment, Muscle Energy Treatment, Indirect Treatment and Direct Treatment    Response:  Improved - The somatic dysfunction is improved but not completely resolved.    Sacrum/Pelvis details:     Examination Method:  Asymmetry, Misalignment, Crepitation, Defects, Masses and Tissue Texture Change, Stability, Laxity, Effusions, Tone    Severity:  Moderate    Treatment Method:  Soft Tissue Treatment, Myofascial Release Treatment, Muscle Energy Treatment, Indirect Treatment, Direct Treatment, High Velocity, Low Amplitude Treatment and Articulatory Treatment    Response:  Improved - The somatic dysfunction is improved but not completely resolved.    Total Regions Treated:   4  Attending provider present in exam room for procedure: No

## 2025-07-09 ENCOUNTER — EVALUATION (OUTPATIENT)
Dept: PHYSICAL THERAPY | Facility: REHABILITATION | Age: 75
End: 2025-07-09
Payer: MEDICARE

## 2025-07-09 ENCOUNTER — PROCEDURE VISIT (OUTPATIENT)
Dept: FAMILY MEDICINE CLINIC | Facility: CLINIC | Age: 75
End: 2025-07-09
Payer: MEDICARE

## 2025-07-09 DIAGNOSIS — M99.07 SOMATIC DYSFUNCTION OF UPPER EXTREMITY: ICD-10-CM

## 2025-07-09 DIAGNOSIS — G89.29 CHRONIC PAIN IN LEFT SHOULDER: ICD-10-CM

## 2025-07-09 DIAGNOSIS — R26.89 BALANCE PROBLEM: Primary | ICD-10-CM

## 2025-07-09 DIAGNOSIS — M54.50 CHRONIC BILATERAL LOW BACK PAIN WITHOUT SCIATICA: Primary | ICD-10-CM

## 2025-07-09 DIAGNOSIS — M99.03 SOMATIC DYSFUNCTION OF LUMBAR REGION: ICD-10-CM

## 2025-07-09 DIAGNOSIS — M99.05 SOMATIC DYSFUNCTION OF PELVIC REGION: ICD-10-CM

## 2025-07-09 DIAGNOSIS — M99.01 SOMATIC DYSFUNCTION OF CERVICAL REGION: ICD-10-CM

## 2025-07-09 DIAGNOSIS — M99.04 SOMATIC DYSFUNCTION OF SACRAL REGION: ICD-10-CM

## 2025-07-09 DIAGNOSIS — G89.29 CHRONIC BILATERAL LOW BACK PAIN WITHOUT SCIATICA: Primary | ICD-10-CM

## 2025-07-09 DIAGNOSIS — M25.512 CHRONIC PAIN IN LEFT SHOULDER: ICD-10-CM

## 2025-07-09 DIAGNOSIS — R29.898 MUSCULAR DECONDITIONING: ICD-10-CM

## 2025-07-09 DIAGNOSIS — M99.00 SOMATIC DYSFUNCTION OF HEAD REGION: ICD-10-CM

## 2025-07-09 PROCEDURE — 97112 NEUROMUSCULAR REEDUCATION: CPT

## 2025-07-09 PROCEDURE — G2211 COMPLEX E/M VISIT ADD ON: HCPCS

## 2025-07-09 PROCEDURE — 97162 PT EVAL MOD COMPLEX 30 MIN: CPT

## 2025-07-09 PROCEDURE — 99213 OFFICE O/P EST LOW 20 MIN: CPT

## 2025-07-09 NOTE — PROGRESS NOTES
PT Evaluation          Past Medical History[1]    Allergies[2]    POC expires Unit limit Auth Expiration date PT/OT + Visit Limit?   12 weeks - 10/1/2025            Visit/Unit Tracking  AUTH Status:  Date         BRII BOWEN Used 1         Remaining             Pertinent Findings:      POC End Date: 10/1/2025                                                                                    Test / Measure     Poor static/dynamic balance    Poor LE strength                         Today's date: 2025  Patient name: Marleny Martinez  : 1950  MRN: [id number]  Referring provider: No ref. provider found  Dx:   Encounter Diagnosis     ICD-10-CM    1. Balance problem  R26.89       2. Muscular deconditioning  R29.898             Assessment  Assessment details: Patient is a 74 y.o. Female who presents to skilled outpatient PT with deficits of balance and mobility. Upon eval today, she demonstrates poor static and dynamic balance, abnormal gait and poor LE strength, which appear to be contributing to her ongoing c/o hip pain and dizziness. I encouraged her to resume her pre-existing HEPs, which were effective in addressing these symptoms. I also provided further interventions to address her deficits of balance and hip strength. See below. She verbalized understanding of these exercises and was able to perform with proper form. She will benefit from skilled PT.        Impairments: Abnormal coordination, Abnormal gait, Abnormal muscle tone, Abnormal or restricted ROM, Activity intolerance, Impaired balance, Impaired physical strength, Lacks appropriate HEP, Poor posture, Poor body mechanics, Pain with function, Safety issue, Weight-bearing intolerance, Abnormal movement, Difficulty understanding, Abnormal muscle firing  Understanding of Dx/Px/POC: Good  Prognosis: Fair    Patient verbalized understanding of POC.         Please contact me if you have  any questions or recommendations. Thank you for the referral and the opportunity to share in Marleny Martinez's care.        Plan  Patient would benefit from: Skilled PT  Planned modality interventions: Biofeedback, Cryotherapy, TENS, Thermotherapy  Planned therapy interventions: Abdominal trunk stabilization, ADL training, Balance, Balance/WB training, Breathing training, Body mechanics training, Coordination, Functional ROM exercises, Gait training, HEP, Joint Mobilization, Manual Therapy, Hamilton taping, Motor coordination training, Neuromuscular re-education, Patient education, Postural training, Strengthening, Stretching, Therapeutic activities, Therapeutic exercises, Therapeutic training, Transfer training, Activity modification, Work reintegration  Frequency: 2x/wk  Duration in weeks: 6  Plan of Care beginning date: 7/9/25  Plan of Care expiration date: 6 weeks - 8/20/2025  Treatment plan discussed with: Patient       Goals  Short Term Goals (4 weeks):    - Patient will improve time on TUG by 2.9 seconds to facilitate improved safety in all ambulation  - Patient will be independent in basic HEP 2-3 weeks  - Patient will improve 5xSTS score by 2.3 seconds to promote improved LE functional strength needed for ADLs  - Patient will complete components of HiMAT to promote agility necessary for sports related tasks    Long Term Goals (12 weeks):  - Patient will be independent in a comprehensive home exercise program  - Patient will improve scoring on DGI by 2.6 points to progress safety  - Patient will improve gait speed by 0.18 m/s to improve safety with community ambulation  - Patient will improve POE by 6 points in order to improve static balance and reduce risk for falls  - Patient will improve scoring on FGA by 4 points to progress safety with dynamic tasks  - Patient will be able to demonstrate HT in gait without veering  - Patient will improve 6 Minute Walk Test score by 190 feet to promote improved  cardiovascular endurance  - Patient will report 50% reduction in near falls in order to improve safety with functional tasks and reduce his risk for falls  - Patient will report going on walks at least 3 days per week to promote independence and improved cardiovascular endurance  - Patient will be able to ascend/descend stairs reciprocally with 1 UE assist to promote independence and safety with ADLs  - Patient will report 50% reduction in near falls when ambulating on uneven terrain      Cut off score    All date taken from [facility] Neuro Section or Rehab Measures      Wasserman/56  MDC: 6 pts  Age Norms:  60-69: M - 55   F - 55  70-79: M - 54   F - 53  80-89: M - 53   F - 50 5xSTS: Marcelo et al 2010  MDC: 2.3 sec  Age Norms:  60-69: 11.1 sec  70-79: 12.6 sec  80-89: 14.8 sec   TUG  MDC: 4.14 sec  Cut off score:  >13.5 sec community dwelling adults  >32.2 frail elderly  <20 I for basic transfers  >30 dependent on transfers 10 Meter Walk Test: Opal et al 2011  MDC: 0.18 m/s  20-29: M - 1.35 m   F - 1.34 m  30-39: M - 1.43 m   F - 1.34 m  40-49: M - 1.43 m   F - 1.39 m  50-59: M - 1.43 m   F - 1.31 m  60-69: M - 1.34 m   F - 1.24 m  70-79: M - 1.26 m   F - 1.13 m  80-89: M - 0.97 m   F - 0.94 m    Household Ambulator < 0.4 m/s  Limited Community Ambulator 0.4 - 0.8 m/s  Community Ambulator 0.8 - 1.2 m/s  Safely cross the street > 1.2 m/s   FGA  MCID: 4 pts  Geriatrics/community < 22/30 fall risk  Geriatrics/community < 20/30 unexplained falls    DGI  MDC: vestibular - 4 pts  MDC: geriatric/community - 3 pts  Falls risk <19/24 mCTSIB  Norm: 20-60 yrs  Eyes open firm: norm sway 0.21-0.48  Eyes closed firm: norm sway 0.48-0.99  Eyes open foam: norm sway 0.38-0.71  Eyes closed foam: norm sway 0.70-2.22   6 Minute Walk Test  MDC: 190.98 ft  MCID: 164 ft    Age Norms  60-69: M - 1876 ft (571.80 m)  F - 1765 ft (537.98 m)  70-79: M - 1729 ft (527.00 m)  F - 1545 ft (470.92 m)  80-89: M - 1368 ft (416.97 m)  F - 1286  ft (391.97 m) ABC: Marily & Adrian, 2003  <67% increased risk for falls         Subjective    History of Present Illness  - Mechanism of injury: 2025: Marleny Martinez is a pleasant 74 y.o. female presenting to PT for deficits of balance and mobility. She reports ongoing hip pain and c/o dizziness, which she thinks are playing a part. She reports having home exercise plans for these, as she underwent PT following R lateral FERNANDO  and has had previous vestibular PT. She reports that she felt good when she was doing her exercises, but she stopped doing them.    - Primary AD: no AD  - Assist level at home: independent  - Decreased fine motor tasks: No      Pain  - Current pain ratin/10  - At best pain ratin/10  - At worst pain ratin/10  - Location: R hip  - Aggravating factors: activity    Social Support  - Steps to enter house: yes  - Stairs in house: yes   - Lives in: house   - Lives with: 99 year old     - Employment status: na    Treatments  - Previous treatment: PT for vertigo  - Current treatment: na, has vestibular HEP but isn't doing it      Objective     LE MMT  - R Hip Flexion: 4/5  L Hip Flexion: 4+/5  - R Hip Extension: 4/5  L Hip Extension: 4/5  - R Hip Abduction: 4-/5  L Hip Abduction: 4+/5  - R Hip Adduction: 4-/5  L Hip Adduction: 4+/5  - R Knee Extension: 4+/5  L Knee Extension: 5/5  - R Knee Flexion: 5/5  L Knee Flexion: 5/5  - R Ankle DF: 5/5   L Ankle DF: 5/5  - R Ankle PF: 4+/5   L Ankle PF: 4+/5    Sensation  - Light touch: intact  - Deep pressure: intact  - Pain: intact  - Temperature: intact    Coordination  - Heel to Shin: intact  - Alternate Toe Taps: intact  - Finger to Nose: intact  - Finger to Finger: intact    Reflexes/Clonus  - Clonus: No, no beat  - Patellar DTR: 0: Absent (Abnormal)    Myelopathy Screen (>3/5 +)  - Hair's Reflex: -  - Babinski Reflex: -  - Inverted Supinator Sign: -  - Age > 45: Yes  - Gait Deviation: No    Gait  - Abnormalities: decreased step and  stride length, slow cautious pace    Access Code: [email or web address]  URL: [email or web address]  Date: 07/09/2025  Prepared by: Onel Pacheco    Exercises  - Tandem Stance  - 1 x daily - 7 x weekly - 2 sets - 30 seconds hold  - Standing Hip Flexion with Counter Support  - 1 x daily - 7 x weekly - 2 sets - 10 reps  - Standing Hip Abduction with Counter Support  - 1 x daily - 7 x weekly - 2 sets - 10 reps  - Standing Hip Extension with Counter Support  - 1 x daily - 7 x weekly - 2 sets - 10 reps           Outcome Measures Initial Eval  7/9        5xSTS 18.9 sec        TUG  - Regular  - Cognitive   15.6 sec          10 meter         POE         FGA         DGI         mCTSIB  - FTEO (firm)  - FTEC (firm)  - FTEO (foam)  - FTEC (foam)   20+ sec  20+ sec  16 sec  5 sec with signif postural sway and reproduction of dizziness        6MWT                                      Precautions:   Past Medical History[3]         [1]   Past Medical History:  Diagnosis Date    Cataract     Lumbar radiculopathy     last assessed 09/22/2017   [2]   Allergies  Allergen Reactions    Latex     Trimethoprim      Action Taken: hives;    [3]   Past Medical History:  Diagnosis Date    Cataract     Lumbar radiculopathy     last assessed 09/22/2017

## 2025-07-09 NOTE — PROGRESS NOTES
The Assessment & Plan     This is a 74 y.o. female who presents for OMT follow-up for:  1. Chronic bilateral low back pain without sciatica        2. Chronic pain in left shoulder        3. Somatic dysfunction of lumbar region        4. Somatic dysfunction of sacral region        5. Somatic dysfunction of pelvic region        6. Somatic dysfunction of cervical region        7. Somatic dysfunction of head region        8. Somatic dysfunction of upper extremity             1. Patient tolerated OMT well for the above problems,  advised patient to drink fluids and can use NSAID for soreness after treatment     2. OMT Follow up in 1 weeks.    Subjective     Marleny Martinez is a 74 y.o. female and is here for a OMT follow up. The patient reports low back pain bilaterally, left hip pain, and left shoulder pain. She was at PT this morning and had greatly increased pain in her low back after doing the exercises.     Is the patient taking Pain medication? yes  Has the patient completed physical therapy for this condition? yes  Did Patient symptoms improve from last OMT appointment? yes        Review of Systems  Review of Systems   Musculoskeletal:  Positive for back pain.   Neurological:  Negative for headaches.         Objective     OMT Exam     OMT    Performed by: Ezio Myers DO  Authorized by: Ezio Myers DO    Universal Protocol:  Consent: Verbal consent obtained  Consent given by: patient  Patient identity confirmed: verbally with patient      Procedure Details:     Region evaluated and treated:  Left Extremities, Head, Cervical, Lumbar, Sacrum/Pelvis and Pelvis Innominate    Extremity Information  Extremities: left upper extremity    Head Details:     Examination Method:  Tissue Texture Change, Stability, Laxity, Effusions, Tone    Severity:  Mild    Treatment Method:  Direct Treatment and Myofascial Release Treatment    Response:  Improved - The somatic dysfunction is improved but not completely  resolved.    Cervical Details:     Examination Method:  Tissue Texture Change, Stability, Laxity, Effusions, Tone, Tenderness, Pain and Asymmetry, Misalignment, Crepitation, Defects, Masses    Severity:  Moderate    Treatment Method:  Myofascial Release Treatment, Soft Tissue Treatment and Direct Treatment    Response:  Improved - The somatic dysfunction is improved but not completely resolved.    Lumbar details:     Examination Method:  Tissue Texture Change, Stability, Laxity, Effusions, Tone, Asymmetry, Misalignment, Crepitation, Defects, Masses and Tenderness, Pain    Severity:  Moderate    Treatment Method:  Counterstrain Treatment, Direct Treatment, Myofascial Release Treatment and Soft Tissue Treatment    Response:  Improved - The somatic dysfunction is improved but not completely resolved.    Sacrum/Pelvis details:     Examination Method:  Asymmetry, Misalignment, Crepitation, Defects, Masses and Tissue Texture Change, Stability, Laxity, Effusions, Tone    Severity:  Mild    Treatment Method:  Balanced Ligamentous Tension, Ligamentous Articular Strain Treatment and Direct Treatment    Response:  Improved - The somatic dysfunction is improved but not completely resolved.    Pelvis Innominate details:     Examination Method:  Asymmetry, Misalignment, Crepitation, Defects, Masses and Tenderness, Pain    Severity:  Moderate    Treatment Method:  Muscle Energy Treatment and Direct Treatment    Response:  Improved - The somatic dysfunction is improved but not completely resolved.    Left Upper Extremity details:     Examination Method:  Tissue Texture Change, Stability, Laxity, Effusions, Tone, Asymmetry, Misalignment, Crepitation, Defects, Masses and Tenderness, Pain    Severity:  Moderate    Osteopathic Findings:  Supraspinatus hypertonicity  Trapezius hypertonicity  Veto's effleurage     Treatment Method:  Direct Treatment, Counterstrain Treatment, Soft Tissue Treatment and Myofascial Release Treatment     Response:  Improved - The somatic dysfunction is improved but not completely resolved.    Total Regions Treated:  6  Attending provider present in exam room for procedure: No

## 2025-07-09 NOTE — PROGRESS NOTES
"PT Evaluation     Today's date: 2025  Patient name: Marleny Martinez  : 1950  MRN: [id number]  Referring provider: No ref. provider found  Dx: No diagnosis found.               Assessment  Impairments: abnormal gait, abnormal or restricted ROM, activity intolerance, impaired balance, impaired physical strength, lacks appropriate home exercise program, pain with function and weight-bearing intolerance    Assessment details: Marleny Martinez is a pleasant 74 y.o. female who presents with ***. Upon eval today, she has WB intolerance, poor LE strength, poor hip AROM and abnormal gait, resulting in worry over not knowing what's wrong, fear of not being able to keep active, and difficulty with ADL performance.  No further referral appears necessary at this time based upon examination results.  I expect she will improve with 8 weeks of skilled strengthening and functional mobility training.  Positive prognostic indicators include {PRECAUTIONS:77075::\"positive attitude toward recovery\"}.  Negative prognostic indicators include {PRECAUTIONS:76566::\"chronicity of symptoms\",\"anxiety\",\"hypertension\",\"high symptom irritability\",\"obesity\"}.  During today's session, the patient was provided with education surrounding precautions, flat ground amb with AD, stair negotiation with AD, possible home modifications to promote patient safety, and therapeutic exercise for the purpose of DVT prevention and preventing contracture formation. All patient questions were answered. Patient verbalized understanding of all of the information provided.      Comparable signs:  1) ***  2) ***    Problem List:  1) ***  2) ***  Understanding of Dx/Px/POC: excellent     Prognosis: excellent    Goals  Impairment based goals  Patient will improve LE strength to 4+/5 in all planes within 4 weeks in order to improve ease and stability with functional mobility.  Patient will improve LE strength to 5/5 in all planes by time of d/c in order to improve " ease and stability with functional mobility.  Patient will improve hip ROM by 10* within 4 weeks in order to improve ease and stability with functional mobility.  Patient will improve hip ROM to WFL by time of d/c in order to improve ease and stability with functional mobility.  Patient will report 50% reduction in pain within 4 weeks.  Patient will tolerate a treatment session centered around addressing deficits of strength and mobility with min c/o pain.      Functional based goals to be completed by time of d/c  Patient will improve FOTO to greater than predicted value.  Patient will be independent with home exercise program.   Patient will be able to manage symptoms independently.     Plan    Planned therapy interventions: abdominal trunk stabilization, activity modification, ADL training, balance/weight bearing training, gait training, home exercise program, therapeutic exercise, therapeutic activities, stretching, strengthening, patient education, neuromuscular re-education, postural training, therapeutic training, joint mobilization, IASTM, kinesiology taping, manual therapy, massage, Hamilton taping, motor coordination training, muscle pump exercises, nerve gliding, self care, work reintegration, fine motor coordination training, flexibility, functional ROM exercises, graded activity, graded exercise, graded motor, IADL retraining, balance, behavior modification, body mechanics training, breathing training, transfer training and coordination    Frequency: 2x week  Duration in weeks: 12  Treatment plan discussed with: patient      Subjective Evaluation    History of Present Illness  Mechanism of injury: 7/9/2025: Marleny Martinez is a pleasant 74 y.o. female presenting to PT s/p L FERNANDO performed on ___.    Patient Goals  Patient goals for therapy: increased motion, improved balance, decreased pain, increased strength, independence with ADLs/IADLs and return to sport/leisure  activities        Objective  IMAGING:    LUMBAR SCREEN  Special questions: {SPECIAL QUESTION:69388}    Sensation:   LEFT RIGHT   L1 Intact Intact   L2 Intact Intact   L3 Intact Intact   L4 Intact Intact   L5 Intact Intact   S1 Intact Intact   S2 Intact Intact     Reflexes:   LEFT RIGHT   Patellar (L3-L4) 2+ 2+   Achilles (S1-S2) 2+ 2+     Babinski: {POS / NE}  Hair: {POS / NE}  Clonus: {POS / NE}     AROM:  Flx ***%   Ext ***%      Right Left   SLR     X-SLR     Slump         Palpation: ***    Posture: ***  Amb: ***  Squatting: ***    HIP:   AROM PROM STRENGTH    RIGHT LEFT RIGHT LEFT RIGHT: _/5 LEFT: _/5   FLX         ABD         EXT         ER         IR           KNEE:   AROM PROM STRENGTH    RIGHT LEFT RIGHT LEFT RIGHT: _/5 LEFT: _/5   FLX         EXT           ANKLE:   AROM PROM STRENGTH    RIGHT LEFT RIGHT LEFT RIGHT: _/5 LEFT: _/5   PF         DF             SPECIAL TESTS   RIGHT LEFT   SEGUNDO RUBI     90/90 HAMSTRING     PAUL     ELY     FEM N. TENSION     SI COMPRESSION     SI DISTRACTION     TRENDELENBURG     PIRIFORMIS       COMMENTS:    TUx STS:         Precautions: Past Medical History[1]    Allergies[2]    POC expires Unit limit Auth Expiration date PT/OT + Visit Limit?   12 weeks - 10/1/2025 *** *** ***         Visit/Unit Tracking  AUTH Status:  Date         *** Used          Remaining             Pertinent Findings:      POC End Date: 10/1/2025                                                                                    Test / Measure     FOTO (Predicted ***) ***   *** ***   *** ***   *** ***             Manuals                                                                 Neuro Re-Ed                                                                                                        Ther Ex                                                                                                                     Ther Activity                                        Gait Training                                       Modalities                                                   [1]  Past Medical History:  Diagnosis Date   • Cataract    • Lumbar radiculopathy     last assessed 09/22/2017   [2]  Allergies  Allergen Reactions   • Latex    • Trimethoprim      Action Taken: hives;

## 2025-07-16 ENCOUNTER — PROCEDURE VISIT (OUTPATIENT)
Dept: FAMILY MEDICINE CLINIC | Facility: CLINIC | Age: 75
End: 2025-07-16
Payer: MEDICARE

## 2025-07-16 ENCOUNTER — APPOINTMENT (OUTPATIENT)
Dept: PHYSICAL THERAPY | Facility: REHABILITATION | Age: 75
End: 2025-07-16
Payer: MEDICARE

## 2025-07-16 ENCOUNTER — EVALUATION (OUTPATIENT)
Dept: PHYSICAL THERAPY | Facility: CLINIC | Age: 75
End: 2025-07-16
Payer: MEDICARE

## 2025-07-16 DIAGNOSIS — M99.02 SOMATIC DYSFUNCTION OF THORACIC REGION: ICD-10-CM

## 2025-07-16 DIAGNOSIS — G89.29 CHRONIC BILATERAL LOW BACK PAIN WITHOUT SCIATICA: Primary | ICD-10-CM

## 2025-07-16 DIAGNOSIS — R29.898 MUSCULAR DECONDITIONING: ICD-10-CM

## 2025-07-16 DIAGNOSIS — M25.512 CHRONIC PAIN OF BOTH SHOULDERS: ICD-10-CM

## 2025-07-16 DIAGNOSIS — M99.07 SOMATIC DYSFUNCTION OF UPPER EXTREMITY: ICD-10-CM

## 2025-07-16 DIAGNOSIS — M99.01 SOMATIC DYSFUNCTION OF CERVICAL REGION: ICD-10-CM

## 2025-07-16 DIAGNOSIS — G89.29 CHRONIC BILATERAL LOW BACK PAIN WITHOUT SCIATICA: ICD-10-CM

## 2025-07-16 DIAGNOSIS — M99.06 SOMATIC DYSFUNCTION OF LOWER EXTREMITY: ICD-10-CM

## 2025-07-16 DIAGNOSIS — G89.29 CHRONIC PAIN OF BOTH SHOULDERS: ICD-10-CM

## 2025-07-16 DIAGNOSIS — M99.03 SOMATIC DYSFUNCTION OF LUMBAR REGION: ICD-10-CM

## 2025-07-16 DIAGNOSIS — M54.50 CHRONIC BILATERAL LOW BACK PAIN WITHOUT SCIATICA: ICD-10-CM

## 2025-07-16 DIAGNOSIS — M99.04 SOMATIC DYSFUNCTION OF SACRAL REGION: ICD-10-CM

## 2025-07-16 DIAGNOSIS — M25.511 CHRONIC PAIN OF BOTH SHOULDERS: ICD-10-CM

## 2025-07-16 DIAGNOSIS — R26.89 BALANCE PROBLEM: Primary | ICD-10-CM

## 2025-07-16 DIAGNOSIS — M54.50 CHRONIC BILATERAL LOW BACK PAIN WITHOUT SCIATICA: Primary | ICD-10-CM

## 2025-07-16 PROCEDURE — 97112 NEUROMUSCULAR REEDUCATION: CPT | Performed by: PHYSICAL THERAPIST

## 2025-07-16 PROCEDURE — 97110 THERAPEUTIC EXERCISES: CPT | Performed by: PHYSICAL THERAPIST

## 2025-07-16 PROCEDURE — 97161 PT EVAL LOW COMPLEX 20 MIN: CPT | Performed by: PHYSICAL THERAPIST

## 2025-07-16 PROCEDURE — G2211 COMPLEX E/M VISIT ADD ON: HCPCS

## 2025-07-16 PROCEDURE — 99213 OFFICE O/P EST LOW 20 MIN: CPT

## 2025-07-16 NOTE — PROGRESS NOTES
PT Evaluation     Today's date: 2025  Patient name: Marleny Martinez  : 1950  MRN: [id number]  Referring provider: Zena Jean-Baptiste PA-C  Dx:   Encounter Diagnosis     ICD-10-CM    1. Balance problem  R26.89       2. Muscular deconditioning  R29.898       3. Chronic bilateral low back pain without sciatica  M54.50     G89.29                      Assessment  Impairments: abnormal or restricted ROM, abnormal movement, activity intolerance, impaired physical strength, lacks appropriate home exercise program and pain with function  Symptom irritability: moderate    Assessment details: Marleny Martinez is a pleasant 74 y.o. female who presents with acute exacerbation of chronic low back pain, and report of balance deficit. Balance exam is not performed today, as patient does not feel that she will be able to tolerate the exam due to her back pain. Mechanical assessment of the lumbar spine is consistent with extension directional preference. This is evidence by improvement in baselines of pain at rest and pain while walking. I discussed exam findings with patient and prescribed an appropriate home program. Primary movement impairment diagnosis of LE weakness, lumbar spine hypomobility. In future visits I plan to perform a functional screen and balance assessment when the patient will be able to tolerate this testing without the lumbar spine being the primary limiting factor.  No further referral appears necessary at this time based upon examination results. Pt. will benefit from skilled PT services to help return patient to status at [facility].             Understanding of Dx/Px/POC: good     Prognosis: good    Goals  Impairment based goals  Patient will achieve full lumbar spine flexion AROM.   Patient will achieve full lumbar spine extension AROM.   Patient will report 50% reduction in VAS at worst.     Function based goals  Patient will be independent in comprehensive HEP upon discharge.  Patient will achieve goal  "FOTO score upon discharge.  Patient will tolerate balance exam.   [facility]     Plan  Patient would benefit from: skilled physical therapy    Planned therapy interventions: activity modification, manual therapy, motor coordination training, neuromuscular re-education, patient education, self care, therapeutic activities, therapeutic exercise, graded activity, home exercise program, graded exercise, functional ROM exercises and strengthening    Frequency: 2x week  Duration in weeks: 8  Plan of Care expiration date: 9/10/2025  Treatment plan discussed with: patient        Subjective    HPI: Patient has hx of balance deficit due to LE weakness, core strength issue and vertigo. She has had vertigo treatment in the past that was successful at helping. She was evaluated at the [facility] location on 7/9 and reports that she started to experience R hip pain after some of the strength testing. She also began to experience B/L lumbar spine \"cramping\" and pain that has been improving with OMT. Her primary complaint today is the balance deficit and LE strength deficit.    Pain:   Location: lumbar spine  Current: 1/10  Worst: 8/10   Aggravating Factors: walking, standing   Alleviating Factors: sitting   Goals: floor to stand tranfers, improve balance, core strengthening exercises; return to ballet exercise classes       Objective    Lower Quarter Screen:  Myotomes  Strength WNL bilaterally.    Dermatomes  Sensation intact bilaterally    Reflexes  R Patellar Reflex: 1+  L Patellar Reflex: 1+  R Achilles Reflex: 1+  L Achilles Reflex: 1+    Lumbar Active Range of Motion  Movement Loss Symptoms Jair Mod Min Nil Symptoms   Flexion  x   Produce  Worse; Tripod sign   Extension  x   No Effect     R SG    x No Effect     L SG   x  Produce  No worse; L lumbar     Other          Polly Assessment:  Repeated Extension in Standing (EIS): Decrease; Better    Static  Prone in Full Extension: " Decrease; Better    Comparable sign:   Flexion AROM  Pain at rest  Pain at walking      Manual Muscle Testing:   Hip flexion:   L 3+/5 R 3+/5  Hip extension:  L 3+/5 R 3+/5  Hip abduction:  L 3+/5 R 3+/5  Knee extension: L 3+/5 R 3+/5  Knee flexion:   L 3+/5 R 3+/5    Palpation: TTP mid lumbar CPA          NO AUTH NEEDED   Insurance Billing Rule ReEval POC expires PT/OT + Visit Limit? Co-Insurance Misc   Medicare CMS  9/10/25 BOMN N/a    AMA CMS                                       Visit/Unit Tracking  Date 7/16                    Used 1                    Remaining                        Pertinent Findings:                                                                                      Test / Measure  7/16/2025     FOTO (Predicted nv) nv   Lumbar flexion AROM Mod limitation; produce, worse   Lumbar extension AROM Mod limitation; no effect   VAS at worst 8/10       Precautions: hx R FERNANDO 2024      Manuals 7/16            Lumbar extension mob TB; gr. III, lower lumbar                                                   Neuro Re-Ed             Hooklying Pball crush             Hooklying Pball crush march                                                                 Education HEP and POC            Ther Ex             Sustained lumbar ext 8 min            GRACIE 10x; HEP            REIL 10x; HEP            Bridge             Standing hip abd/ext                                                    Ther Activity                                       Gait Training                                       Modalities

## 2025-07-16 NOTE — LETTER
2025    Zena Jean-Baptiste PA-C  [address] [place] [address]    Patient: Marleny Martinez   YOB: 1950   Date of Visit: 2025     Encounter Diagnosis     ICD-10-CM    1. Balance problem  R26.89       2. Muscular deconditioning  R29.898       3. Chronic bilateral low back pain without sciatica  M54.50     G89.29           Dear Dr. Zena Jean-Baptiste PA-C:    Thank you for your recent referral of Marleny Martinez. Please review the attached evaluation summary from Marleny's recent visit.     Please verify that you agree with the plan of care by signing the attached order.     If you have any questions or concerns, please do not hesitate to call.     I sincerely appreciate the opportunity to share in the care of one of your patients and hope to have another opportunity to work with you in the near future.       Sincerely,    Adilson Burciaga, PT      Referring Provider:      I certify that I have read the below Plan of Care and certify the need for these services furnished under this plan of treatment while under my care.                    Zena Jean-Baptiste PA-C  [address] [place] [address]  Via Fax: [phone]          PT Evaluation     Today's date: 2025  Patient name: Marleny Martinez  : 1950  MRN: [id number]  Referring provider: Zena Jean-Baptiste PA-C  Dx:   Encounter Diagnosis     ICD-10-CM    1. Balance problem  R26.89       2. Muscular deconditioning  R29.898       3. Chronic bilateral low back pain without sciatica  M54.50     G89.29                      Assessment  Impairments: abnormal or restricted ROM, abnormal movement, activity intolerance, impaired physical strength, lacks appropriate home exercise program and pain with function  Symptom irritability: moderate    Assessment details: Marleny Martinez is a pleasant 74 y.o. female who presents with acute exacerbation of chronic low back pain, and report of balance deficit. Balance exam is not performed today,  as patient does not feel that she will be able to tolerate the exam due to her back pain. Mechanical assessment of the lumbar spine is consistent with extension directional preference. This is evidence by improvement in baselines of pain at rest and pain while walking. I discussed exam findings with patient and prescribed an appropriate home program. Primary movement impairment diagnosis of LE weakness, lumbar spine hypomobility. In future visits I plan to perform a functional screen and balance assessment when the patient will be able to tolerate this testing without the lumbar spine being the primary limiting factor.  No further referral appears necessary at this time based upon examination results. Pt. will benefit from skilled PT services to help return patient to status at [facility].             Understanding of Dx/Px/POC: good     Prognosis: good    Goals  Impairment based goals  Patient will achieve full lumbar spine flexion AROM.   Patient will achieve full lumbar spine extension AROM.   Patient will report 50% reduction in VAS at worst.     Function based goals  Patient will be independent in comprehensive HEP upon discharge.  Patient will achieve goal FOTO score upon discharge.  Patient will tolerate balance exam.   Patient will return to normal exercise routine at [facility].     Plan  Patient would benefit from: skilled physical therapy    Planned therapy interventions: activity modification, manual therapy, motor coordination training, neuromuscular re-education, patient education, self care, therapeutic activities, therapeutic exercise, graded activity, home exercise program, graded exercise, functional ROM exercises and strengthening    Frequency: 2x week  Duration in weeks: 8  Plan of Care expiration date: 9/10/2025  Treatment plan discussed with: patient        Subjective    HPI: Patient has hx of balance deficit due to LE weakness, core strength issue and vertigo. She has had vertigo treatment in the past  "that was successful at helping. She was evaluated at the [facility] location on 7/9 and reports that she started to experience R hip pain after some of the strength testing. She also began to experience B/L lumbar spine \"cramping\" and pain that has been improving with OMT. Her primary complaint today is the balance deficit and LE strength deficit.    Pain:   Location: lumbar spine  Current: 1/10  Worst: 8/10   Aggravating Factors: walking, standing   Alleviating Factors: sitting   Goals: floor to stand tranfers, improve balance, core strengthening exercises; return to ballet exercise classes       Objective    Lower Quarter Screen:  Myotomes  Strength WNL bilaterally.    Dermatomes  Sensation intact bilaterally    Reflexes  R Patellar Reflex: 1+  L Patellar Reflex: 1+  R Achilles Reflex: 1+  L Achilles Reflex: 1+    Lumbar Active Range of Motion  Movement Loss Symptoms Jair Mod Min Nil Symptoms   Flexion  x   Produce  Worse; Tripod sign   Extension  x   No Effect     R SG    x No Effect     L SG   x  Produce  No worse; L lumbar     Other          Polly Assessment:  Repeated Extension in Standing (EIS): Decrease; Better    Static  Prone in Full Extension: Decrease; Better    Comparable sign:   Flexion AROM  Pain at rest  Pain at walking      Manual Muscle Testing:   Hip flexion:   L 3+/5 R 3+/5  Hip extension:  L 3+/5 R 3+/5  Hip abduction:  L 3+/5 R 3+/5  Knee extension: L 3+/5 R 3+/5  Knee flexion:   L 3+/5 R 3+/5    Palpation: TTP mid lumbar CPA          NO AUTH NEEDED   Insurance Billing Rule ReEval POC expires PT/OT + Visit Limit? Co-Insurance Misc   Medicare CMS  9/10/25 BOMN N/a    AMA CMS                                       Visit/Unit Tracking  Date 7/16                    Used 1                    Remaining                        Pertinent Findings:                                                                                      Test / Measure  7/16/2025     FOTO (Predicted nv) nv   Lumbar flexion " AROM Mod limitation; produce, worse   Lumbar extension AROM Mod limitation; no effect   VAS at worst 8/10       Precautions: hx R FERNANDO 2024      Manuals 7/16            Lumbar extension mob TB; gr. III, lower lumbar                                                   Neuro Re-Ed             Hooklying Pball crush             Hooklying Pball crush march                                                                 Education HEP and POC            Ther Ex             Sustained lumbar ext 8 min            GRACIE 10x; HEP            REIL 10x; HEP            Bridge             Standing hip abd/ext                                                    Ther Activity                                       Gait Training                                       Modalities

## 2025-07-16 NOTE — PROGRESS NOTES
The Assessment & Plan     This is a 74 y.o. female who presents for OMT follow-up for:  1. Chronic bilateral low back pain without sciatica  OMT      2. Somatic dysfunction of lumbar region  OMT      3. Somatic dysfunction of sacral region  OMT      4. Somatic dysfunction of cervical region  OMT      5. Somatic dysfunction of upper extremity  OMT      6. Somatic dysfunction of thoracic region  OMT      7. Chronic pain of both shoulders  OMT      8. Somatic dysfunction of lower extremity             1. Patient tolerated OMT well for the above problems,  advised patient to drink fluids and can use NSAID for soreness after treatment     2. OMT Follow up in 2 weeks.    Subjective     Marleny Martinez is a 74 y.o. female and is here for a OMT follow up. The patient reports lower back pain, right posterior hip pain, and left shoulder pain. She is doing PT once per week currently for back pain and balance training.    Is the patient taking Pain medication? yes  Has the patient completed physical therapy for this condition? yes  Did Patient symptoms improve from last OMT appointment? yes        Review of Systems  Review of Systems   Musculoskeletal:  Positive for back pain.   Neurological:  Negative for headaches.         Objective     OMT Exam     OMT    Performed by: Ezio Myers DO  Authorized by: Ezio Myers DO    Universal Protocol:  Consent: Verbal consent obtained  Risks and benefits: risks, benefits and alternatives were discussed  Consent given by: patient      Procedure Details:     Region evaluated and treated:  Cervical, Left Extremities, Lumbar, Sacrum/Pelvis and Right Extremities    Extremity Information  Extremities: left upper extremity    Extremity Information  Extremities: right lower extremity    Cervical Details:     Examination Method:  Tissue Texture Change, Stability, Laxity, Effusions, Tone and Tenderness, Pain    Severity:  Mild    Treatment Method:  Soft Tissue Treatment, Myofascial  Release Treatment and Direct Treatment    Response:  Improved - The somatic dysfunction is improved but not completely resolved.    Lumbar details:     Examination Method:  Tissue Texture Change, Stability, Laxity, Effusions, Tone, Tenderness, Pain and Asymmetry, Misalignment, Crepitation, Defects, Masses    Severity:  Moderate    Treatment Method:  Muscle Energy Treatment, Myofascial Release Treatment, Soft Tissue Treatment, Direct Treatment and Counterstrain Treatment    Response:  Improved - The somatic dysfunction is improved but not completely resolved.    Sacrum/Pelvis details:     Examination Method:  Tissue Texture Change, Stability, Laxity, Effusions, Tone and Tenderness, Pain    Severity:  Moderate    Treatment Method:  Direct Treatment and Soft Tissue Treatment    Response:  Improved - The somatic dysfunction is improved but not completely resolved.    Right Lower Extremity details:     Examination Method:  Tissue Texture Change, Stability, Laxity, Effusions, Tone and Tenderness, Pain    Severity:  Mild    Treatment Method:  Soft Tissue Treatment, Direct Treatment and Counterstrain Treatment    Response:  Improved - The somatic dysfunction is improved but not completely resolved.    Left Upper Extremity details:     Examination Method:  Asymmetry, Misalignment, Crepitation, Defects, Masses and Tenderness, Pain    Severity:  Moderate    Treatment Method:  Soft Tissue Treatment, Myofascial Release Treatment and Direct Treatment    Response:  Improved - The somatic dysfunction is improved but not completely resolved.    Total Regions Treated:  5  Attending provider present in exam room for procedure: No

## 2025-07-23 ENCOUNTER — OFFICE VISIT (OUTPATIENT)
Dept: PHYSICAL THERAPY | Facility: CLINIC | Age: 75
End: 2025-07-23
Payer: MEDICARE

## 2025-07-23 ENCOUNTER — APPOINTMENT (OUTPATIENT)
Dept: PHYSICAL THERAPY | Facility: REHABILITATION | Age: 75
End: 2025-07-23
Payer: MEDICARE

## 2025-07-23 DIAGNOSIS — M54.50 CHRONIC BILATERAL LOW BACK PAIN WITHOUT SCIATICA: ICD-10-CM

## 2025-07-23 DIAGNOSIS — R29.898 MUSCULAR DECONDITIONING: ICD-10-CM

## 2025-07-23 DIAGNOSIS — R26.89 BALANCE PROBLEM: Primary | ICD-10-CM

## 2025-07-23 DIAGNOSIS — G89.29 CHRONIC BILATERAL LOW BACK PAIN WITHOUT SCIATICA: ICD-10-CM

## 2025-07-23 PROCEDURE — 97140 MANUAL THERAPY 1/> REGIONS: CPT | Performed by: PHYSICAL THERAPIST

## 2025-07-23 PROCEDURE — 97112 NEUROMUSCULAR REEDUCATION: CPT | Performed by: PHYSICAL THERAPIST

## 2025-07-23 PROCEDURE — 97110 THERAPEUTIC EXERCISES: CPT | Performed by: PHYSICAL THERAPIST

## 2025-07-23 NOTE — PROGRESS NOTES
"Daily Note     Today's date: 2025  Patient name: Marleny Martinez  : 1950  MRN: [id number]  Referring provider: Zena Jean-Baptiste PA-C  Dx:   Encounter Diagnosis     ICD-10-CM    1. Balance problem  R26.89       2. Muscular deconditioning  R29.898       3. Chronic bilateral low back pain without sciatica  M54.50     G89.29                      Subjective: Patient reports good tolerance to last tx. She reports that she has been experiencing a lot of \"cramping\"       Objective: See treatment diary below      Assessment: Patient demonstrates positive response to sustained extension and REIL to decrease lumbar spine cramping symptom. She is able to perform standing lumbopelvic stability exercises. Patient reports overall reduction in symptoms at the end of tx. Next visit patient would like to work on floor to stand transfer.       Plan: Continue per plan of care.        NO AUTH NEEDED   Insurance Billing Rule ReEval POC expires PT/OT + Visit Limit? Co-Insurance Misc   Medicare CMS  9/10/25 BOMN N/a    AMA CMS                                       Visit/Unit Tracking  Date                    Used 1 2                   Remaining                        Pertinent Findings:                                                                                      Test / Measure  2025     FOTO (Predicted nv) nv   Lumbar flexion AROM Mod limitation; produce, worse   Lumbar extension AROM Mod limitation; no effect   VAS at worst 8/10       Precautions: hx R FERNANDO       Manuals            Lumbar extension mob TB; gr. III, lower lumbar TB; gr. III, lower lumbar                                                  Neuro Re-Ed             Hooklying Pball crush             Hooklying Pball crush march             Standing Pball crush x ea                                                  Education HEP and POC            Ther Ex             Sustained lumbar ext 8 min 10 min           GRACIE 10x; HEP     "        REIL 10x; HEP 10x           Bridge             Standing hip abd/ext  20x ea           Row  3x10; 15#           Shoulder extension  3x10; 8#                                     Ther Activity             Floor transfer   nv                                                 Gait Training                                       Modalities

## 2025-07-29 ENCOUNTER — PROCEDURE VISIT (OUTPATIENT)
Age: 75
End: 2025-07-29
Payer: MEDICARE

## 2025-07-29 DIAGNOSIS — M99.01 SOMATIC DYSFUNCTION OF CERVICAL REGION: ICD-10-CM

## 2025-07-29 DIAGNOSIS — M99.03 SOMATIC DYSFUNCTION OF LUMBAR REGION: ICD-10-CM

## 2025-07-29 DIAGNOSIS — M99.07 SOMATIC DYSFUNCTION OF UPPER EXTREMITY: ICD-10-CM

## 2025-07-29 DIAGNOSIS — G89.29 CHRONIC BILATERAL LOW BACK PAIN WITHOUT SCIATICA: Primary | ICD-10-CM

## 2025-07-29 DIAGNOSIS — M54.50 CHRONIC BILATERAL LOW BACK PAIN WITHOUT SCIATICA: Primary | ICD-10-CM

## 2025-07-29 DIAGNOSIS — M99.00 SOMATIC DYSFUNCTION OF HEAD REGION: ICD-10-CM

## 2025-07-29 DIAGNOSIS — M99.04 SOMATIC DYSFUNCTION OF SACRAL REGION: ICD-10-CM

## 2025-07-29 PROCEDURE — G2211 COMPLEX E/M VISIT ADD ON: HCPCS

## 2025-07-29 PROCEDURE — 99213 OFFICE O/P EST LOW 20 MIN: CPT

## 2025-07-30 ENCOUNTER — APPOINTMENT (OUTPATIENT)
Dept: PHYSICAL THERAPY | Facility: CLINIC | Age: 75
End: 2025-07-30
Payer: MEDICARE

## 2025-08-06 ENCOUNTER — OFFICE VISIT (OUTPATIENT)
Dept: PHYSICAL THERAPY | Facility: CLINIC | Age: 75
End: 2025-08-06
Payer: MEDICARE

## 2025-08-06 DIAGNOSIS — M54.50 CHRONIC BILATERAL LOW BACK PAIN WITHOUT SCIATICA: ICD-10-CM

## 2025-08-06 DIAGNOSIS — G89.29 CHRONIC BILATERAL LOW BACK PAIN WITHOUT SCIATICA: ICD-10-CM

## 2025-08-06 DIAGNOSIS — R29.898 MUSCULAR DECONDITIONING: ICD-10-CM

## 2025-08-06 DIAGNOSIS — R26.89 BALANCE PROBLEM: Primary | ICD-10-CM

## 2025-08-06 PROCEDURE — 97530 THERAPEUTIC ACTIVITIES: CPT | Performed by: PHYSICAL THERAPIST

## 2025-08-12 ENCOUNTER — TELEPHONE (OUTPATIENT)
Age: 75
End: 2025-08-12

## 2025-08-13 ENCOUNTER — OFFICE VISIT (OUTPATIENT)
Dept: PHYSICAL THERAPY | Facility: CLINIC | Age: 75
End: 2025-08-13
Payer: MEDICARE

## 2025-08-18 ENCOUNTER — PROCEDURE VISIT (OUTPATIENT)
Age: 75
End: 2025-08-18
Payer: MEDICARE

## 2025-08-18 DIAGNOSIS — M99.04 SOMATIC DYSFUNCTION OF SACRAL REGION: ICD-10-CM

## 2025-08-18 DIAGNOSIS — M99.03 SOMATIC DYSFUNCTION OF LUMBAR REGION: ICD-10-CM

## 2025-08-18 DIAGNOSIS — M99.01 SOMATIC DYSFUNCTION OF CERVICAL REGION: ICD-10-CM

## 2025-08-18 DIAGNOSIS — M99.02 SOMATIC DYSFUNCTION OF THORACIC REGION: ICD-10-CM

## 2025-08-18 DIAGNOSIS — G89.29 CHRONIC PAIN OF BOTH SHOULDERS: ICD-10-CM

## 2025-08-18 DIAGNOSIS — M25.511 CHRONIC PAIN OF BOTH SHOULDERS: ICD-10-CM

## 2025-08-18 DIAGNOSIS — M54.50 CHRONIC BILATERAL LOW BACK PAIN WITHOUT SCIATICA: Primary | ICD-10-CM

## 2025-08-18 DIAGNOSIS — M25.512 CHRONIC PAIN OF BOTH SHOULDERS: ICD-10-CM

## 2025-08-18 DIAGNOSIS — M99.07 SOMATIC DYSFUNCTION OF UPPER EXTREMITY: ICD-10-CM

## 2025-08-18 DIAGNOSIS — G89.29 CHRONIC BILATERAL LOW BACK PAIN WITHOUT SCIATICA: Primary | ICD-10-CM

## 2025-08-18 PROCEDURE — 99213 OFFICE O/P EST LOW 20 MIN: CPT

## 2025-08-21 ENCOUNTER — OFFICE VISIT (OUTPATIENT)
Dept: PHYSICAL THERAPY | Facility: CLINIC | Age: 75
End: 2025-08-21
Payer: MEDICARE

## 2025-08-21 DIAGNOSIS — R29.898 MUSCULAR DECONDITIONING: ICD-10-CM

## 2025-08-21 DIAGNOSIS — R26.89 BALANCE PROBLEM: Primary | ICD-10-CM

## 2025-08-21 DIAGNOSIS — M54.50 CHRONIC BILATERAL LOW BACK PAIN WITHOUT SCIATICA: ICD-10-CM

## 2025-08-21 DIAGNOSIS — G89.29 CHRONIC BILATERAL LOW BACK PAIN WITHOUT SCIATICA: ICD-10-CM

## 2025-08-21 PROCEDURE — 97110 THERAPEUTIC EXERCISES: CPT | Performed by: PHYSICAL THERAPIST
